# Patient Record
Sex: FEMALE | Race: BLACK OR AFRICAN AMERICAN | Employment: UNEMPLOYED | URBAN - METROPOLITAN AREA
[De-identification: names, ages, dates, MRNs, and addresses within clinical notes are randomized per-mention and may not be internally consistent; named-entity substitution may affect disease eponyms.]

---

## 2017-01-16 ENCOUNTER — HOSPITAL ENCOUNTER (OUTPATIENT)
Dept: ULTRASOUND IMAGING | Age: 22
Discharge: HOME | End: 2017-01-16
Payer: COMMERCIAL

## 2017-01-16 DIAGNOSIS — N91.2 AMENORRHEA: ICD-10-CM

## 2017-01-16 PROCEDURE — 76805 OB US >/= 14 WKS SNGL FETUS: CPT

## 2017-04-21 ENCOUNTER — HOSPITAL ENCOUNTER (OUTPATIENT)
Dept: INFUSION THERAPY | Age: 22
Setting detail: INFUSION SERIES
Discharge: HOME | End: 2017-04-21
Payer: COMMERCIAL

## 2017-04-21 VITALS
DIASTOLIC BLOOD PRESSURE: 62 MMHG | RESPIRATION RATE: 16 BRPM | TEMPERATURE: 97.8 F | HEART RATE: 88 BPM | SYSTOLIC BLOOD PRESSURE: 132 MMHG

## 2017-04-21 PROCEDURE — 96372 THER/PROPH/DIAG INJ SC/IM: CPT

## 2017-04-21 PROCEDURE — 6360000002 HC RX W HCPCS: Performed by: OBSTETRICS & GYNECOLOGY

## 2017-04-21 RX ADMIN — HUMAN RHO(D) IMMUNE GLOBULIN 300 MCG: 1500 SOLUTION INTRAMUSCULAR; INTRAVENOUS at 12:50

## 2017-05-31 ENCOUNTER — HOSPITAL ENCOUNTER (OUTPATIENT)
Age: 22
Discharge: HOME | End: 2017-05-31
Attending: OBSTETRICS & GYNECOLOGY | Admitting: OBSTETRICS & GYNECOLOGY
Payer: COMMERCIAL

## 2017-05-31 VITALS
DIASTOLIC BLOOD PRESSURE: 67 MMHG | RESPIRATION RATE: 20 BRPM | HEART RATE: 77 BPM | TEMPERATURE: 98.3 F | SYSTOLIC BLOOD PRESSURE: 139 MMHG

## 2017-05-31 LAB
AMPHETAMINE SCREEN, URINE: NORMAL
BACTERIA: ABNORMAL /HPF
BARBITURATE SCREEN URINE: NORMAL
BENZODIAZEPINE SCREEN, URINE: NORMAL
BILIRUBIN URINE: NEGATIVE
BLOOD, URINE: NEGATIVE
CANNABINOID SCREEN URINE: NORMAL
CLARITY: CLEAR
COCAINE METABOLITE SCREEN URINE: NORMAL
COLOR: YELLOW
EPITHELIAL CELLS, UA: ABNORMAL /HPF
GLUCOSE URINE: NEGATIVE MG/DL
KETONES, URINE: NEGATIVE MG/DL
LEUKOCYTE ESTERASE, URINE: ABNORMAL
Lab: NORMAL
NITRITE, URINE: NEGATIVE
OPIATE SCREEN URINE: NORMAL
PH UA: 7.5 (ref 5–9)
PHENCYCLIDINE SCREEN URINE: NORMAL
PROTEIN UA: NEGATIVE MG/DL
RBC UA: ABNORMAL /HPF (ref 0–2)
RENAL EPITHELIAL, UA: ABNORMAL /HPF
SPECIFIC GRAVITY UA: 1.01 (ref 1–1.03)
TRICHOMONAS: PRESENT /HPF
UROBILINOGEN, URINE: 2 E.U./DL
WBC UA: ABNORMAL /HPF (ref 0–5)
YEAST: PRESENT

## 2017-05-31 PROCEDURE — 99283 EMERGENCY DEPT VISIT LOW MDM: CPT | Performed by: OBSTETRICS & GYNECOLOGY

## 2017-05-31 PROCEDURE — 87491 CHLMYD TRACH DNA AMP PROBE: CPT

## 2017-05-31 PROCEDURE — 87086 URINE CULTURE/COLONY COUNT: CPT

## 2017-05-31 PROCEDURE — 59025 FETAL NON-STRESS TEST: CPT

## 2017-05-31 PROCEDURE — 80307 DRUG TEST PRSMV CHEM ANLYZR: CPT

## 2017-05-31 PROCEDURE — 99283 EMERGENCY DEPT VISIT LOW MDM: CPT

## 2017-05-31 PROCEDURE — 6370000000 HC RX 637 (ALT 250 FOR IP): Performed by: OBSTETRICS & GYNECOLOGY

## 2017-05-31 PROCEDURE — 87591 N.GONORRHOEAE DNA AMP PROB: CPT

## 2017-05-31 PROCEDURE — 81001 URINALYSIS AUTO W/SCOPE: CPT

## 2017-05-31 RX ORDER — METRONIDAZOLE 500 MG/1
500 TABLET ORAL 2 TIMES DAILY
Qty: 14 TABLET | Refills: 0 | Status: SHIPPED | OUTPATIENT
Start: 2017-05-31 | End: 2017-06-07

## 2017-05-31 RX ORDER — FERROUS SULFATE 325(65) MG
325 TABLET ORAL
COMMUNITY
End: 2019-08-08 | Stop reason: ALTCHOICE

## 2017-05-31 RX ORDER — ACETAMINOPHEN 325 MG/1
650 TABLET ORAL EVERY 4 HOURS PRN
Status: DISCONTINUED | OUTPATIENT
Start: 2017-05-31 | End: 2017-05-31 | Stop reason: HOSPADM

## 2017-05-31 RX ADMIN — ACETAMINOPHEN 650 MG: 325 TABLET ORAL at 16:49

## 2017-05-31 ASSESSMENT — PAIN SCALES - GENERAL: PAINLEVEL_OUTOF10: 0

## 2017-06-02 LAB
C. TRACHOMATIS DNA ,URINE: NEGATIVE
N. GONORRHOEAE DNA, URINE: NEGATIVE
URINE CULTURE, ROUTINE: NORMAL

## 2017-06-17 ENCOUNTER — HOSPITAL ENCOUNTER (INPATIENT)
Age: 22
LOS: 2 days | Discharge: HOME | DRG: 540 | End: 2017-06-19
Attending: OBSTETRICS & GYNECOLOGY | Admitting: OBSTETRICS & GYNECOLOGY
Payer: COMMERCIAL

## 2017-06-17 ENCOUNTER — ANESTHESIA EVENT (OUTPATIENT)
Dept: LABOR AND DELIVERY | Age: 22
DRG: 540 | End: 2017-06-17
Payer: COMMERCIAL

## 2017-06-17 ENCOUNTER — APPOINTMENT (OUTPATIENT)
Dept: GENERAL RADIOLOGY | Age: 22
DRG: 540 | End: 2017-06-17
Payer: COMMERCIAL

## 2017-06-17 ENCOUNTER — ANESTHESIA (OUTPATIENT)
Dept: LABOR AND DELIVERY | Age: 22
DRG: 540 | End: 2017-06-17
Payer: COMMERCIAL

## 2017-06-17 VITALS — SYSTOLIC BLOOD PRESSURE: 139 MMHG | OXYGEN SATURATION: 100 % | DIASTOLIC BLOOD PRESSURE: 79 MMHG

## 2017-06-17 LAB
ABO/RH: NORMAL
ALBUMIN SERPL-MCNC: 3.1 G/DL (ref 3.9–4.9)
ALP BLD-CCNC: 269 U/L (ref 40–130)
ALT SERPL-CCNC: 11 U/L (ref 0–33)
AMPHETAMINE SCREEN, URINE: NORMAL
ANION GAP SERPL CALCULATED.3IONS-SCNC: 15 MEQ/L (ref 7–13)
ANTIBODY IDENTIFICATION: NORMAL
ANTIBODY SCREEN: NORMAL
AST SERPL-CCNC: 15 U/L (ref 0–35)
BACTERIA: ABNORMAL /HPF
BARBITURATE SCREEN URINE: NORMAL
BASOPHILS ABSOLUTE: 0 K/UL (ref 0–0.2)
BASOPHILS RELATIVE PERCENT: 0.4 %
BENZODIAZEPINE SCREEN, URINE: NORMAL
BILIRUB SERPL-MCNC: 0.2 MG/DL (ref 0–1.2)
BILIRUBIN URINE: NEGATIVE
BLOOD, URINE: ABNORMAL
BUN BLDV-MCNC: 6 MG/DL (ref 6–20)
CALCIUM SERPL-MCNC: 8.5 MG/DL (ref 8.6–10.2)
CANNABINOID SCREEN URINE: NORMAL
CHLORIDE BLD-SCNC: 100 MEQ/L (ref 98–107)
CLARITY: ABNORMAL
CO2: 18 MEQ/L (ref 22–29)
COCAINE METABOLITE SCREEN URINE: NORMAL
COLOR: YELLOW
CREAT SERPL-MCNC: 0.76 MG/DL (ref 0.5–0.9)
EOSINOPHILS ABSOLUTE: 0.2 K/UL (ref 0–0.7)
EOSINOPHILS RELATIVE PERCENT: 1.9 %
EPITHELIAL CELLS, UA: ABNORMAL /HPF
FETAL SCREEN: NORMAL
GFR AFRICAN AMERICAN: >60
GFR NON-AFRICAN AMERICAN: >60
GLOBULIN: 2.6 G/DL (ref 2.3–3.5)
GLUCOSE BLD-MCNC: 88 MG/DL (ref 74–109)
GLUCOSE URINE: NEGATIVE MG/DL
HCT VFR BLD CALC: 35.2 % (ref 37–47)
HEMOGLOBIN: 11.7 G/DL (ref 12–16)
HEPATITIS B SURFACE ANTIGEN INTERPRETATION: NORMAL
KETONES, URINE: NEGATIVE MG/DL
LEUKOCYTE ESTERASE, URINE: ABNORMAL
LYMPHOCYTES ABSOLUTE: 2.7 K/UL (ref 1–4.8)
LYMPHOCYTES RELATIVE PERCENT: 31.4 %
Lab: NORMAL
MCH RBC QN AUTO: 32.6 PG (ref 27–31.3)
MCHC RBC AUTO-ENTMCNC: 33.4 % (ref 33–37)
MCV RBC AUTO: 97.8 FL (ref 82–100)
MONOCYTES ABSOLUTE: 0.6 K/UL (ref 0.2–0.8)
MONOCYTES RELATIVE PERCENT: 6.5 %
NEUTROPHILS ABSOLUTE: 5.2 K/UL (ref 1.4–6.5)
NEUTROPHILS RELATIVE PERCENT: 59.8 %
NITRITE, URINE: NEGATIVE
OPIATE SCREEN URINE: NORMAL
PDW BLD-RTO: 13.4 % (ref 11.5–14.5)
PH UA: 7 (ref 5–9)
PHENCYCLIDINE SCREEN URINE: NORMAL
PLATELET # BLD: 164 K/UL (ref 130–400)
POTASSIUM SERPL-SCNC: 3.8 MEQ/L (ref 3.5–5.1)
PROTEIN UA: 30 MG/DL
RBC # BLD: 3.6 M/UL (ref 4.2–5.4)
RBC UA: ABNORMAL /HPF (ref 0–2)
RUBELLA ANTIBODY IGG: 105.6 IU/ML
SODIUM BLD-SCNC: 133 MEQ/L (ref 132–144)
SPECIFIC GRAVITY UA: 1.01 (ref 1–1.03)
TOTAL PROTEIN: 5.7 G/DL (ref 6.4–8.1)
UROBILINOGEN, URINE: 1 E.U./DL
WBC # BLD: 8.6 K/UL (ref 4.5–11)
WBC UA: ABNORMAL /HPF (ref 0–5)

## 2017-06-17 PROCEDURE — 2500000003 HC RX 250 WO HCPCS: Performed by: NURSE ANESTHETIST, CERTIFIED REGISTERED

## 2017-06-17 PROCEDURE — 6370000000 HC RX 637 (ALT 250 FOR IP): Performed by: OBSTETRICS & GYNECOLOGY

## 2017-06-17 PROCEDURE — 36415 COLL VENOUS BLD VENIPUNCTURE: CPT

## 2017-06-17 PROCEDURE — 80307 DRUG TEST PRSMV CHEM ANLYZR: CPT

## 2017-06-17 PROCEDURE — 3700000000 HC ANESTHESIA ATTENDED CARE

## 2017-06-17 PROCEDURE — 6360000002 HC RX W HCPCS: Performed by: NURSE ANESTHETIST, CERTIFIED REGISTERED

## 2017-06-17 PROCEDURE — 88307 TISSUE EXAM BY PATHOLOGIST: CPT

## 2017-06-17 PROCEDURE — 87340 HEPATITIS B SURFACE AG IA: CPT

## 2017-06-17 PROCEDURE — 86317 IMMUNOASSAY INFECTIOUS AGENT: CPT

## 2017-06-17 PROCEDURE — 4A1HXCZ MONITORING OF PRODUCTS OF CONCEPTION, CARDIAC RATE, EXTERNAL APPROACH: ICD-10-PCS

## 2017-06-17 PROCEDURE — 80053 COMPREHEN METABOLIC PANEL: CPT

## 2017-06-17 PROCEDURE — 6360000002 HC RX W HCPCS: Performed by: OBSTETRICS & GYNECOLOGY

## 2017-06-17 PROCEDURE — 85025 COMPLETE CBC W/AUTO DIFF WBC: CPT

## 2017-06-17 PROCEDURE — 74000 XR ABDOMEN LIMITED (KUB): CPT

## 2017-06-17 PROCEDURE — 3609079900 HC CESAREAN SECTION

## 2017-06-17 PROCEDURE — 3700000001 HC ADD 15 MINUTES (ANESTHESIA)

## 2017-06-17 PROCEDURE — 86850 RBC ANTIBODY SCREEN: CPT

## 2017-06-17 PROCEDURE — 86900 BLOOD TYPING SEROLOGIC ABO: CPT

## 2017-06-17 PROCEDURE — 6360000002 HC RX W HCPCS

## 2017-06-17 PROCEDURE — 86870 RBC ANTIBODY IDENTIFICATION: CPT

## 2017-06-17 PROCEDURE — 86592 SYPHILIS TEST NON-TREP QUAL: CPT

## 2017-06-17 PROCEDURE — 85461 HEMOGLOBIN FETAL: CPT

## 2017-06-17 PROCEDURE — 86901 BLOOD TYPING SEROLOGIC RH(D): CPT

## 2017-06-17 PROCEDURE — 2580000003 HC RX 258: Performed by: OBSTETRICS & GYNECOLOGY

## 2017-06-17 PROCEDURE — 1220000000 HC SEMI PRIVATE OB R&B

## 2017-06-17 PROCEDURE — 6370000000 HC RX 637 (ALT 250 FOR IP)

## 2017-06-17 PROCEDURE — 3700000025 ANESTHESIA EPIDURAL BLOCK: Performed by: NURSE ANESTHETIST, CERTIFIED REGISTERED

## 2017-06-17 PROCEDURE — 2580000003 HC RX 258

## 2017-06-17 PROCEDURE — 81001 URINALYSIS AUTO W/SCOPE: CPT

## 2017-06-17 RX ORDER — SODIUM CHLORIDE 0.9 % (FLUSH) 0.9 %
10 SYRINGE (ML) INJECTION PRN
Status: DISCONTINUED | OUTPATIENT
Start: 2017-06-17 | End: 2017-06-19 | Stop reason: HOSPADM

## 2017-06-17 RX ORDER — LANOLIN 100 %
OINTMENT (GRAM) TOPICAL
Status: DISCONTINUED | OUTPATIENT
Start: 2017-06-17 | End: 2017-06-19 | Stop reason: HOSPADM

## 2017-06-17 RX ORDER — IBUPROFEN 800 MG/1
800 TABLET ORAL EVERY 8 HOURS SCHEDULED
Status: DISCONTINUED | OUTPATIENT
Start: 2017-06-17 | End: 2017-06-19 | Stop reason: HOSPADM

## 2017-06-17 RX ORDER — NALBUPHINE HCL 10 MG/ML
10 AMPUL (ML) INJECTION
Status: DISCONTINUED | OUTPATIENT
Start: 2017-06-17 | End: 2017-06-19 | Stop reason: HOSPADM

## 2017-06-17 RX ORDER — HYDROMORPHONE HCL 110MG/55ML
0.25 PATIENT CONTROLLED ANALGESIA SYRINGE INTRAVENOUS
Status: DISCONTINUED | OUTPATIENT
Start: 2017-06-17 | End: 2017-06-19 | Stop reason: HOSPADM

## 2017-06-17 RX ORDER — KETOROLAC TROMETHAMINE 30 MG/ML
30 INJECTION, SOLUTION INTRAMUSCULAR; INTRAVENOUS EVERY 6 HOURS
Status: ACTIVE | OUTPATIENT
Start: 2017-06-17 | End: 2017-06-18

## 2017-06-17 RX ORDER — KETOROLAC TROMETHAMINE 30 MG/ML
INJECTION, SOLUTION INTRAMUSCULAR; INTRAVENOUS PRN
Status: DISCONTINUED | OUTPATIENT
Start: 2017-06-17 | End: 2017-06-17 | Stop reason: SDUPTHER

## 2017-06-17 RX ORDER — OXYCODONE HYDROCHLORIDE AND ACETAMINOPHEN 5; 325 MG/1; MG/1
2 TABLET ORAL EVERY 4 HOURS PRN
Status: DISCONTINUED | OUTPATIENT
Start: 2017-06-17 | End: 2017-06-19 | Stop reason: HOSPADM

## 2017-06-17 RX ORDER — HYDROMORPHONE HCL 110MG/55ML
0.5 PATIENT CONTROLLED ANALGESIA SYRINGE INTRAVENOUS
Status: DISCONTINUED | OUTPATIENT
Start: 2017-06-17 | End: 2017-06-19 | Stop reason: HOSPADM

## 2017-06-17 RX ORDER — SODIUM CHLORIDE 0.9 % (FLUSH) 0.9 %
10 SYRINGE (ML) INJECTION EVERY 12 HOURS SCHEDULED
Status: DISCONTINUED | OUTPATIENT
Start: 2017-06-17 | End: 2017-06-19 | Stop reason: HOSPADM

## 2017-06-17 RX ORDER — NALOXONE HYDROCHLORIDE 0.4 MG/ML
0.4 INJECTION, SOLUTION INTRAMUSCULAR; INTRAVENOUS; SUBCUTANEOUS PRN
Status: DISCONTINUED | OUTPATIENT
Start: 2017-06-17 | End: 2017-06-19 | Stop reason: HOSPADM

## 2017-06-17 RX ORDER — ONDANSETRON 2 MG/ML
4 INJECTION INTRAMUSCULAR; INTRAVENOUS EVERY 6 HOURS PRN
Status: DISCONTINUED | OUTPATIENT
Start: 2017-06-17 | End: 2017-06-19 | Stop reason: HOSPADM

## 2017-06-17 RX ORDER — SODIUM CHLORIDE, SODIUM LACTATE, POTASSIUM CHLORIDE, CALCIUM CHLORIDE 600; 310; 30; 20 MG/100ML; MG/100ML; MG/100ML; MG/100ML
INJECTION, SOLUTION INTRAVENOUS
Status: DISPENSED
Start: 2017-06-17 | End: 2017-06-17

## 2017-06-17 RX ORDER — NALBUPHINE HCL 10 MG/ML
5 AMPUL (ML) INJECTION EVERY 4 HOURS PRN
Status: DISCONTINUED | OUTPATIENT
Start: 2017-06-17 | End: 2017-06-19 | Stop reason: HOSPADM

## 2017-06-17 RX ORDER — DOCUSATE SODIUM 100 MG/1
100 CAPSULE, LIQUID FILLED ORAL 2 TIMES DAILY
Status: DISCONTINUED | OUTPATIENT
Start: 2017-06-17 | End: 2017-06-19 | Stop reason: HOSPADM

## 2017-06-17 RX ORDER — NALOXONE HYDROCHLORIDE 0.4 MG/ML
0.4 INJECTION, SOLUTION INTRAMUSCULAR; INTRAVENOUS; SUBCUTANEOUS PRN
Status: DISCONTINUED | OUTPATIENT
Start: 2017-06-17 | End: 2017-06-17

## 2017-06-17 RX ORDER — OXYTOCIN 10 [USP'U]/ML
INJECTION, SOLUTION INTRAMUSCULAR; INTRAVENOUS PRN
Status: DISCONTINUED | OUTPATIENT
Start: 2017-06-17 | End: 2017-06-17 | Stop reason: SDUPTHER

## 2017-06-17 RX ORDER — FENTANYL CITRATE 50 UG/ML
INJECTION, SOLUTION INTRAMUSCULAR; INTRAVENOUS PRN
Status: DISCONTINUED | OUTPATIENT
Start: 2017-06-17 | End: 2017-06-17 | Stop reason: SDUPTHER

## 2017-06-17 RX ORDER — ONDANSETRON 2 MG/ML
INJECTION INTRAMUSCULAR; INTRAVENOUS PRN
Status: DISCONTINUED | OUTPATIENT
Start: 2017-06-17 | End: 2017-06-17 | Stop reason: SDUPTHER

## 2017-06-17 RX ORDER — LIDOCAINE HYDROCHLORIDE 20 MG/ML
INJECTION, SOLUTION EPIDURAL; INFILTRATION; INTRACAUDAL; PERINEURAL PRN
Status: DISCONTINUED | OUTPATIENT
Start: 2017-06-17 | End: 2017-06-17 | Stop reason: SDUPTHER

## 2017-06-17 RX ORDER — OXYCODONE HYDROCHLORIDE AND ACETAMINOPHEN 5; 325 MG/1; MG/1
1 TABLET ORAL EVERY 4 HOURS PRN
Status: DISCONTINUED | OUTPATIENT
Start: 2017-06-17 | End: 2017-06-19 | Stop reason: HOSPADM

## 2017-06-17 RX ORDER — SODIUM CHLORIDE, SODIUM LACTATE, POTASSIUM CHLORIDE, CALCIUM CHLORIDE 600; 310; 30; 20 MG/100ML; MG/100ML; MG/100ML; MG/100ML
INJECTION, SOLUTION INTRAVENOUS CONTINUOUS
Status: DISCONTINUED | OUTPATIENT
Start: 2017-06-17 | End: 2017-06-19 | Stop reason: HOSPADM

## 2017-06-17 RX ORDER — DEXAMETHASONE SODIUM PHOSPHATE 10 MG/ML
INJECTION INTRAMUSCULAR; INTRAVENOUS PRN
Status: DISCONTINUED | OUTPATIENT
Start: 2017-06-17 | End: 2017-06-17 | Stop reason: SDUPTHER

## 2017-06-17 RX ORDER — ACETAMINOPHEN 325 MG/1
650 TABLET ORAL EVERY 4 HOURS PRN
Status: DISCONTINUED | OUTPATIENT
Start: 2017-06-17 | End: 2017-06-19 | Stop reason: HOSPADM

## 2017-06-17 RX ORDER — CEFAZOLIN SODIUM 1 G/3ML
INJECTION, POWDER, FOR SOLUTION INTRAMUSCULAR; INTRAVENOUS PRN
Status: DISCONTINUED | OUTPATIENT
Start: 2017-06-17 | End: 2017-06-17 | Stop reason: SDUPTHER

## 2017-06-17 RX ADMIN — CEFAZOLIN 2000 MG: 330 INJECTION, POWDER, FOR SOLUTION INTRAMUSCULAR; INTRAVENOUS at 06:30

## 2017-06-17 RX ADMIN — OXYCODONE HYDROCHLORIDE AND ACETAMINOPHEN 2 TABLET: 5; 325 TABLET ORAL at 20:27

## 2017-06-17 RX ADMIN — SODIUM CHLORIDE, POTASSIUM CHLORIDE, SODIUM LACTATE AND CALCIUM CHLORIDE: 600; 310; 30; 20 INJECTION, SOLUTION INTRAVENOUS at 05:50

## 2017-06-17 RX ADMIN — LIDOCAINE HYDROCHLORIDE 10 ML: 20 INJECTION, SOLUTION EPIDURAL; INFILTRATION; INTRACAUDAL; PERINEURAL at 06:27

## 2017-06-17 RX ADMIN — HUMAN RHO(D) IMMUNE GLOBULIN 300 MCG: 1500 SOLUTION INTRAMUSCULAR; INTRAVENOUS at 16:30

## 2017-06-17 RX ADMIN — FENTANYL CITRATE 100 MCG: 50 INJECTION, SOLUTION INTRAMUSCULAR; INTRAVENOUS at 06:27

## 2017-06-17 RX ADMIN — NALBUPHINE HYDROCHLORIDE 10 MG: 10 INJECTION, SOLUTION INTRAMUSCULAR; INTRAVENOUS; SUBCUTANEOUS at 02:13

## 2017-06-17 RX ADMIN — IBUPROFEN 800 MG: 800 TABLET, FILM COATED ORAL at 20:28

## 2017-06-17 RX ADMIN — SODIUM CHLORIDE, POTASSIUM CHLORIDE, SODIUM LACTATE AND CALCIUM CHLORIDE: 600; 310; 30; 20 INJECTION, SOLUTION INTRAVENOUS at 18:22

## 2017-06-17 RX ADMIN — DEXAMETHASONE SODIUM PHOSPHATE 10 MG: 10 INJECTION INTRAMUSCULAR; INTRAVENOUS at 06:34

## 2017-06-17 RX ADMIN — ONDANSETRON 4 MG: 2 INJECTION, SOLUTION INTRAMUSCULAR; INTRAVENOUS at 06:34

## 2017-06-17 RX ADMIN — OXYTOCIN 10 UNITS: 10 INJECTION INTRAVENOUS at 06:41

## 2017-06-17 RX ADMIN — SODIUM CHLORIDE, POTASSIUM CHLORIDE, SODIUM LACTATE AND CALCIUM CHLORIDE: 600; 310; 30; 20 INJECTION, SOLUTION INTRAVENOUS at 06:50

## 2017-06-17 RX ADMIN — SODIUM CHLORIDE, POTASSIUM CHLORIDE, SODIUM LACTATE AND CALCIUM CHLORIDE 125 ML/HR: 600; 310; 30; 20 INJECTION, SOLUTION INTRAVENOUS at 10:18

## 2017-06-17 RX ADMIN — DEXTROSE MONOHYDRATE 5 MILLION UNITS: 5 INJECTION INTRAVENOUS at 02:16

## 2017-06-17 RX ADMIN — KETOROLAC TROMETHAMINE 30 MG: 30 INJECTION, SOLUTION INTRAMUSCULAR; INTRAVENOUS at 06:49

## 2017-06-17 RX ADMIN — OXYTOCIN 20 UNITS: 10 INJECTION INTRAVENOUS at 06:50

## 2017-06-17 RX ADMIN — Medication: at 09:59

## 2017-06-17 RX ADMIN — DOCUSATE SODIUM 100 MG: 100 CAPSULE ORAL at 20:30

## 2017-06-17 ASSESSMENT — PAIN SCALES - GENERAL
PAINLEVEL_OUTOF10: 8
PAINLEVEL_OUTOF10: 7
PAINLEVEL_OUTOF10: 0

## 2017-06-18 LAB
HCT VFR BLD CALC: 24.9 % (ref 37–47)
HEMOGLOBIN: 8.3 G/DL (ref 12–16)
MCH RBC QN AUTO: 32.4 PG (ref 27–31.3)
MCHC RBC AUTO-ENTMCNC: 33.3 % (ref 33–37)
MCV RBC AUTO: 97.2 FL (ref 82–100)
PDW BLD-RTO: 13.4 % (ref 11.5–14.5)
PLATELET # BLD: 133 K/UL (ref 130–400)
RBC # BLD: 2.57 M/UL (ref 4.2–5.4)
RPR: NORMAL
WBC # BLD: 13.5 K/UL (ref 4.5–11)

## 2017-06-18 PROCEDURE — 6370000000 HC RX 637 (ALT 250 FOR IP)

## 2017-06-18 PROCEDURE — 85027 COMPLETE CBC AUTOMATED: CPT

## 2017-06-18 PROCEDURE — 6370000000 HC RX 637 (ALT 250 FOR IP): Performed by: OBSTETRICS & GYNECOLOGY

## 2017-06-18 PROCEDURE — 99024 POSTOP FOLLOW-UP VISIT: CPT | Performed by: OBSTETRICS & GYNECOLOGY

## 2017-06-18 PROCEDURE — 36415 COLL VENOUS BLD VENIPUNCTURE: CPT

## 2017-06-18 PROCEDURE — 59514 CESAREAN DELIVERY ONLY: CPT

## 2017-06-18 PROCEDURE — 1220000000 HC SEMI PRIVATE OB R&B

## 2017-06-18 RX ADMIN — IBUPROFEN 800 MG: 800 TABLET, FILM COATED ORAL at 22:02

## 2017-06-18 RX ADMIN — IBUPROFEN 800 MG: 800 TABLET, FILM COATED ORAL at 13:54

## 2017-06-18 RX ADMIN — OXYCODONE HYDROCHLORIDE AND ACETAMINOPHEN 2 TABLET: 5; 325 TABLET ORAL at 10:25

## 2017-06-18 RX ADMIN — OXYCODONE HYDROCHLORIDE AND ACETAMINOPHEN 1 TABLET: 5; 325 TABLET ORAL at 21:22

## 2017-06-18 RX ADMIN — DOCUSATE SODIUM 100 MG: 100 CAPSULE ORAL at 10:24

## 2017-06-18 RX ADMIN — IBUPROFEN 800 MG: 800 TABLET, FILM COATED ORAL at 06:19

## 2017-06-18 ASSESSMENT — PAIN SCALES - GENERAL
PAINLEVEL_OUTOF10: 7
PAINLEVEL_OUTOF10: 6
PAINLEVEL_OUTOF10: 4
PAINLEVEL_OUTOF10: 7
PAINLEVEL_OUTOF10: 4

## 2017-06-19 VITALS
TEMPERATURE: 98.2 F | RESPIRATION RATE: 20 BRPM | HEIGHT: 65 IN | DIASTOLIC BLOOD PRESSURE: 80 MMHG | OXYGEN SATURATION: 100 % | BODY MASS INDEX: 31.65 KG/M2 | HEART RATE: 67 BPM | WEIGHT: 190 LBS | SYSTOLIC BLOOD PRESSURE: 133 MMHG

## 2017-06-19 PROCEDURE — 6370000000 HC RX 637 (ALT 250 FOR IP): Performed by: OBSTETRICS & GYNECOLOGY

## 2017-06-19 PROCEDURE — 6370000000 HC RX 637 (ALT 250 FOR IP)

## 2017-06-19 PROCEDURE — 2500000003 HC RX 250 WO HCPCS

## 2017-06-19 RX ORDER — OXYCODONE HYDROCHLORIDE AND ACETAMINOPHEN 5; 325 MG/1; MG/1
1 TABLET ORAL EVERY 6 HOURS PRN
Qty: 30 TABLET | Refills: 0 | Status: SHIPPED | OUTPATIENT
Start: 2017-06-19 | End: 2017-06-26

## 2017-06-19 RX ORDER — IBUPROFEN 600 MG/1
600 TABLET ORAL EVERY 6 HOURS PRN
Qty: 60 TABLET | Refills: 3 | Status: SHIPPED | OUTPATIENT
Start: 2017-06-19 | End: 2019-08-08 | Stop reason: ALTCHOICE

## 2017-06-19 RX ADMIN — OXYCODONE HYDROCHLORIDE AND ACETAMINOPHEN 1 TABLET: 5; 325 TABLET ORAL at 05:01

## 2017-06-19 RX ADMIN — IBUPROFEN 800 MG: 800 TABLET, FILM COATED ORAL at 05:53

## 2017-06-19 RX ADMIN — OXYCODONE HYDROCHLORIDE AND ACETAMINOPHEN 2 TABLET: 5; 325 TABLET ORAL at 01:04

## 2017-06-19 RX ADMIN — DOCUSATE SODIUM 100 MG: 100 CAPSULE ORAL at 09:16

## 2017-06-19 RX ADMIN — OXYCODONE HYDROCHLORIDE AND ACETAMINOPHEN 2 TABLET: 5; 325 TABLET ORAL at 12:46

## 2017-06-19 ASSESSMENT — PAIN SCALES - GENERAL
PAINLEVEL_OUTOF10: 6
PAINLEVEL_OUTOF10: 3
PAINLEVEL_OUTOF10: 5
PAINLEVEL_OUTOF10: 5

## 2017-09-20 LAB — PATHOLOGY REPORT: NORMAL

## 2018-05-30 ENCOUNTER — OFFICE VISIT (OUTPATIENT)
Dept: FAMILY MEDICINE CLINIC | Age: 23
End: 2018-05-30
Payer: COMMERCIAL

## 2018-05-30 VITALS
WEIGHT: 185 LBS | HEIGHT: 65 IN | DIASTOLIC BLOOD PRESSURE: 78 MMHG | SYSTOLIC BLOOD PRESSURE: 136 MMHG | OXYGEN SATURATION: 100 % | TEMPERATURE: 98.8 F | HEART RATE: 78 BPM | BODY MASS INDEX: 30.82 KG/M2

## 2018-05-30 DIAGNOSIS — T73.3XXA FATIGUE DUE TO EXCESSIVE EXERTION, INITIAL ENCOUNTER: Primary | ICD-10-CM

## 2018-05-30 PROCEDURE — 99213 OFFICE O/P EST LOW 20 MIN: CPT | Performed by: NURSE PRACTITIONER

## 2018-05-30 PROCEDURE — 4004F PT TOBACCO SCREEN RCVD TLK: CPT | Performed by: NURSE PRACTITIONER

## 2018-05-30 PROCEDURE — G8417 CALC BMI ABV UP PARAM F/U: HCPCS | Performed by: NURSE PRACTITIONER

## 2018-05-30 PROCEDURE — G8427 DOCREV CUR MEDS BY ELIG CLIN: HCPCS | Performed by: NURSE PRACTITIONER

## 2018-05-30 ASSESSMENT — ENCOUNTER SYMPTOMS
SHORTNESS OF BREATH: 0
NAUSEA: 0
DIARRHEA: 0
CONSTIPATION: 0
COUGH: 0
ABDOMINAL PAIN: 0
SORE THROAT: 0
BACK PAIN: 0

## 2019-04-08 ENCOUNTER — HOSPITAL ENCOUNTER (EMERGENCY)
Age: 24
Discharge: HOME | End: 2019-04-08
Attending: EMERGENCY MEDICINE
Payer: COMMERCIAL

## 2019-04-08 VITALS
HEART RATE: 93 BPM | OXYGEN SATURATION: 98 % | DIASTOLIC BLOOD PRESSURE: 66 MMHG | WEIGHT: 179 LBS | SYSTOLIC BLOOD PRESSURE: 119 MMHG | BODY MASS INDEX: 29.82 KG/M2 | RESPIRATION RATE: 16 BRPM | TEMPERATURE: 98.6 F | HEIGHT: 65 IN

## 2019-04-08 DIAGNOSIS — A64 SEXUALLY TRANSMITTED DISEASE (STD): Primary | ICD-10-CM

## 2019-04-08 PROCEDURE — 99282 EMERGENCY DEPT VISIT SF MDM: CPT

## 2019-04-08 PROCEDURE — 6360000002 HC RX W HCPCS: Performed by: EMERGENCY MEDICINE

## 2019-04-08 PROCEDURE — 96372 THER/PROPH/DIAG INJ SC/IM: CPT

## 2019-04-08 PROCEDURE — 6370000000 HC RX 637 (ALT 250 FOR IP): Performed by: EMERGENCY MEDICINE

## 2019-04-08 RX ORDER — METRONIDAZOLE 500 MG/1
2000 TABLET ORAL ONCE
Status: COMPLETED | OUTPATIENT
Start: 2019-04-08 | End: 2019-04-08

## 2019-04-08 RX ORDER — AZITHROMYCIN 1 G
1 PACKET (EA) ORAL ONCE
Status: COMPLETED | OUTPATIENT
Start: 2019-04-08 | End: 2019-04-08

## 2019-04-08 RX ORDER — CEFTRIAXONE SODIUM 250 MG/1
250 INJECTION, POWDER, FOR SOLUTION INTRAMUSCULAR; INTRAVENOUS ONCE
Status: COMPLETED | OUTPATIENT
Start: 2019-04-08 | End: 2019-04-08

## 2019-04-08 RX ADMIN — METRONIDAZOLE 2000 MG: 500 TABLET ORAL at 19:53

## 2019-04-08 RX ADMIN — CEFTRIAXONE SODIUM 250 MG: 250 INJECTION, POWDER, FOR SOLUTION INTRAMUSCULAR; INTRAVENOUS at 20:08

## 2019-04-08 RX ADMIN — AZITHROMYCIN 1 G: 1 POWDER, FOR SUSPENSION ORAL at 19:54

## 2019-04-08 ASSESSMENT — ENCOUNTER SYMPTOMS
WHEEZING: 0
VOMITING: 0
FACIAL SWELLING: 0
RHINORRHEA: 0
SHORTNESS OF BREATH: 0
EYE DISCHARGE: 0
COLOR CHANGE: 0
ABDOMINAL PAIN: 0
PHOTOPHOBIA: 0
ABDOMINAL DISTENTION: 0

## 2019-04-09 NOTE — ED PROVIDER NOTES
[facility] ED  eMERGENCY dEPARTMENT eNCOUnter      Pt Name: Claudetta Honour  MRN: [id number]  [place] 1995  Date of evaluation: 4/8/2019  Provider: Rajni Zavala, [address] [place] [address]       Chief Complaint   Patient presents with    Vaginal Discharge     x 3 days (yellow)         HISTORY OF PRESENT ILLNESS   (Location/Symptom, Timing/Onset,Context/Setting, Quality, Duration, Modifying Factors, Severity)  Note limiting factors. Claudetta Honour is a 26 y.o. female who presents to the emergency department with a chief complaint of yellow vaginal discharge for three days. She states that she and her boyfriend had been on a break for a while and he came to her after they had gotten back together and told her that he was having discharge from his penis. She developed symptoms shortly afterward. He was treated and she is asking to be treated. She denies any abdominal pain or vaginal bleeding. She denies pregnancy stating that she just took a home pregnancy test that was negative. HPI    NursingNotes were reviewed. REVIEW OF SYSTEMS    (2-9 systems for level 4, 10 or more for level 5)     Review of Systems   Constitutional: Negative for activity change and appetite change. HENT: Negative for congestion, facial swelling and rhinorrhea. Eyes: Negative for photophobia and discharge. Respiratory: Negative for shortness of breath and wheezing. Cardiovascular: Negative for chest pain. Gastrointestinal: Negative for abdominal distention, abdominal pain and vomiting. Endocrine: Negative for polydipsia and polyphagia. Genitourinary: Positive for vaginal discharge. Negative for difficulty urinating, frequency, menstrual problem, pelvic pain and vaginal bleeding. Musculoskeletal: Negative for gait problem. Skin: Negative for color change. Allergic/Immunologic: Negative for immunocompromised state. Neurological: Negative for dizziness, weakness and light-headedness. Hematological: Negative for adenopathy. Psychiatric/Behavioral: Negative for behavioral problems. Except as noted above the remainder of the review of systems was reviewed and negative. PAST MEDICAL HISTORY   History reviewed. No pertinent past medical history. SURGICALHISTORY       Past Surgical History:   Procedure Laterality Date     SECTION N/A 2017     SECTION performed by Yelitza Collins MD at [facility] OR    TONSILLECTOMY AND ADENOIDECTOMY           CURRENT MEDICATIONS       Previous Medications    FERROUS SULFATE 325 (65 FE) MG TABLET    Take 325 mg by mouth daily (with breakfast)    IBUPROFEN (ADVIL;MOTRIN) 600 MG TABLET    Take 1 tablet by mouth every 6 hours as needed for Pain       ALLERGIES     Patient has no known allergies. FAMILY HISTORY     History reviewed. No pertinent family history.        SOCIAL HISTORY       Social History     Socioeconomic History    Marital status: Single     Spouse name: None    Number of children: None    Years of education: None    Highest education level: None   Occupational History    None   Social Needs    Financial resource strain: None    Food insecurity:     Worry: None     Inability: None    Transportation needs:     Medical: None     Non-medical: None   Tobacco Use    Smoking status: Current Every Day Smoker     Packs/day: 1.00     Types: Cigarettes    Smokeless tobacco: Never Used   Substance and Sexual Activity    Alcohol use: No    Drug use: No    Sexual activity: None   Lifestyle    Physical activity:     Days per week: None     Minutes per session: None    Stress: None   Relationships    Social connections:     Talks on phone: None     Gets together: None     Attends Zoroastrian service: None     Active member of club or organization: None     Attends meetings of clubs or organizations: None     Relationship status: None    Intimate partner violence:     Fear of current or ex partner: None other labs were within normal range or not returned as of this dictation. EMERGENCY DEPARTMENT COURSE and DIFFERENTIAL DIAGNOSIS/MDM:   Vitals:    Vitals:    04/08/19 1855   BP: 119/66   Pulse: 93   Resp: 16   Temp: 98.6 °F (37 °C)   TempSrc: Oral   SpO2: 98%   Weight: 179 lb (81.2 kg)   Height: 5' 5\" (1.651 m)       Patient declines intravaginal exam asking if she can just be treated. We discuss the need to feel for cervical motion tenderness and she states that she has had no belly pain and she just doesn't feel like going through it. We talk about how this can affect fertility and she finally agrees. I do a vaginal exam and her cervix is normal in color and nontender on manipulation. She is treated with medicines for all sexually transmitted diseases and cultures are not sent per her request.  She is discharged in stable condition. She knows to not engage in sex again until both her and her partner's symptoms clear completely. Should her symptoms persist despite treatment today she is to follow up with [facility] and dentistry. [facility]    CRITICAL CARE TIME   Total Critical Care time was 0 minutes, excluding separately reportableprocedures. There was a high probability of clinicallysignificant/life threatening deterioration in the patient's condition which required my urgent intervention. CONSULTS:  None    PROCEDURES:  Unless otherwise noted below, none     Procedures    FINAL IMPRESSION      1. Sexually transmitted disease (STD)          DISPOSITION/PLAN   DISPOSITION Discharge - Pending Orders Complete 04/08/2019 08:13:43 PM      PATIENT REFERRED TO:  [address] [place] [address].   [address] [place], [place] [id number] [phone]      As needed      DISCHARGE MEDICATIONS:  New Prescriptions    No medications on file          (Please note that portions of this note were completed with a voice recognition program.  Efforts were made to edit the dictations but occasionally words are mis-transcribed.)    Keira Pop DO (electronically signed)  Attending Emergency Physician          Keira Pop DO  04/08/19 2026

## 2019-08-08 ENCOUNTER — OFFICE VISIT (OUTPATIENT)
Dept: FAMILY MEDICINE CLINIC | Age: 24
End: 2019-08-08
Payer: COMMERCIAL

## 2019-08-08 VITALS
HEIGHT: 65 IN | WEIGHT: 179.8 LBS | HEART RATE: 91 BPM | SYSTOLIC BLOOD PRESSURE: 136 MMHG | RESPIRATION RATE: 16 BRPM | OXYGEN SATURATION: 100 % | DIASTOLIC BLOOD PRESSURE: 76 MMHG | BODY MASS INDEX: 29.96 KG/M2 | TEMPERATURE: 98.3 F

## 2019-08-08 DIAGNOSIS — N89.8 VAGINAL DISCHARGE: ICD-10-CM

## 2019-08-08 DIAGNOSIS — N89.8 VAGINAL DISCHARGE: Primary | ICD-10-CM

## 2019-08-08 LAB
BILIRUBIN, POC: ABNORMAL
BLOOD URINE, POC: ABNORMAL
CLARITY, POC: ABNORMAL
COLOR, POC: ABNORMAL
CONTROL: NORMAL
GLUCOSE URINE, POC: ABNORMAL
KETONES, POC: ABNORMAL
LEUKOCYTE EST, POC: ABNORMAL
NITRITE, POC: ABNORMAL
PH, POC: 7
PREGNANCY TEST URINE, POC: NORMAL
PROTEIN, POC: ABNORMAL
SPECIFIC GRAVITY, POC: 1.02
UROBILINOGEN, POC: ABNORMAL

## 2019-08-08 PROCEDURE — 81025 URINE PREGNANCY TEST: CPT | Performed by: NURSE PRACTITIONER

## 2019-08-08 PROCEDURE — 96372 THER/PROPH/DIAG INJ SC/IM: CPT | Performed by: NURSE PRACTITIONER

## 2019-08-08 PROCEDURE — 99214 OFFICE O/P EST MOD 30 MIN: CPT | Performed by: NURSE PRACTITIONER

## 2019-08-08 PROCEDURE — 81003 URINALYSIS AUTO W/O SCOPE: CPT | Performed by: NURSE PRACTITIONER

## 2019-08-08 RX ORDER — METRONIDAZOLE 500 MG/1
500 TABLET ORAL 2 TIMES DAILY
Qty: 14 TABLET | Refills: 0 | Status: SHIPPED | OUTPATIENT
Start: 2019-08-08 | End: 2019-08-15

## 2019-08-08 RX ORDER — CEFTRIAXONE 1 G/1
1 INJECTION, POWDER, FOR SOLUTION INTRAMUSCULAR; INTRAVENOUS ONCE
Status: COMPLETED | OUTPATIENT
Start: 2019-08-08 | End: 2019-08-08

## 2019-08-08 RX ORDER — CEFTRIAXONE 1 G/1
1 INJECTION, POWDER, FOR SOLUTION INTRAMUSCULAR; INTRAVENOUS ONCE
Qty: 1 G | Refills: 0
Start: 2019-08-08 | End: 2019-08-08 | Stop reason: CLARIF

## 2019-08-08 RX ADMIN — CEFTRIAXONE 1 G: 1 INJECTION, POWDER, FOR SOLUTION INTRAMUSCULAR; INTRAVENOUS at 16:58

## 2019-08-08 ASSESSMENT — ENCOUNTER SYMPTOMS
GASTROINTESTINAL NEGATIVE: 1
SHORTNESS OF BREATH: 0
EYE PAIN: 0
VOMITING: 0
CONSTIPATION: 0
DIARRHEA: 0
RESPIRATORY NEGATIVE: 1
COUGH: 0
BACK PAIN: 0
WHEEZING: 0
ABDOMINAL PAIN: 0
NAUSEA: 0
SORE THROAT: 0
EYE DISCHARGE: 0

## 2019-08-08 ASSESSMENT — PATIENT HEALTH QUESTIONNAIRE - PHQ9
SUM OF ALL RESPONSES TO PHQ9 QUESTIONS 1 & 2: 0
1. LITTLE INTEREST OR PLEASURE IN DOING THINGS: 0
SUM OF ALL RESPONSES TO PHQ QUESTIONS 1-9: 0
SUM OF ALL RESPONSES TO PHQ QUESTIONS 1-9: 0
2. FEELING DOWN, DEPRESSED OR HOPELESS: 0

## 2019-08-08 NOTE — PROGRESS NOTES
cefTRIAXone (ROCEPHIN) 1 g in dextrose 5 % 50 mL IVPB    cefTRIAXone (ROCEPHIN) injection 1 g     Medications Discontinued During This Encounter   Medication Reason    ferrous sulfate 325 (65 FE) MG tablet Therapy completed    ibuprofen (ADVIL;MOTRIN) 600 MG tablet Therapy completed    cefTRIAXone (ROCEPHIN) 1 g injection ERROR    cefTRIAXone (ROCEPHIN) 1 g in dextrose 5 % 50 mL IVPB ERROR     Return if symptoms worsen or fail to improve. Reviewed with the patient: current clinical status,medications, activities and diet. Side effects, adverse effects of the medication prescribed today, as well as treatment plan/ rationale and result expectations have been discussed with the patient who expresses understanding and desires to proceed. Close follow up to evaluate treatment results and for coordination of care. I have reviewed the patient's medical history in detail and updated the computerized patient record.     Murphy Segundo, APRN - CNP

## 2019-08-09 LAB
CLUE CELLS: NORMAL
TRICHOMONAS PREP: NORMAL
TRICHOMONAS VAGINALIS SCREEN: NEGATIVE
YEAST WET PREP: NORMAL

## 2019-08-11 LAB
SPECIMEN SOURCE: NORMAL
T. VAGINALIS AMPLIFIED: NEGATIVE

## 2019-08-13 LAB
C TRACH DNA GENITAL QL NAA+PROBE: NEGATIVE
N. GONORRHOEAE DNA: NEGATIVE

## 2020-05-07 ENCOUNTER — OFFICE VISIT (OUTPATIENT)
Dept: OBGYN CLINIC | Age: 25
End: 2020-05-07
Payer: COMMERCIAL

## 2020-05-07 VITALS
HEART RATE: 86 BPM | SYSTOLIC BLOOD PRESSURE: 110 MMHG | BODY MASS INDEX: 31.45 KG/M2 | WEIGHT: 189 LBS | DIASTOLIC BLOOD PRESSURE: 58 MMHG

## 2020-05-07 DIAGNOSIS — N91.2 AMENORRHEA: ICD-10-CM

## 2020-05-07 LAB
AMPHETAMINE SCREEN, URINE: ABNORMAL
BARBITURATE SCREEN URINE: ABNORMAL
BASOPHILS ABSOLUTE: 0 K/UL (ref 0–0.2)
BASOPHILS RELATIVE PERCENT: 0.4 %
BENZODIAZEPINE SCREEN, URINE: ABNORMAL
CANNABINOID SCREEN URINE: POSITIVE
COCAINE METABOLITE SCREEN URINE: ABNORMAL
EOSINOPHILS ABSOLUTE: 0.3 K/UL (ref 0–0.7)
EOSINOPHILS RELATIVE PERCENT: 6.2 %
GLUCOSE BLD-MCNC: 89 MG/DL (ref 70–99)
GONADOTROPIN, CHORIONIC (HCG) QUANT: NORMAL MIU/ML
HBA1C MFR BLD: 5.1 % (ref 4.8–5.9)
HCT VFR BLD CALC: 33.4 % (ref 37–47)
HEMOGLOBIN: 10.8 G/DL (ref 12–16)
HEPATITIS B SURFACE ANTIGEN INTERPRETATION: NORMAL
HEPATITIS C ANTIBODY INTERPRETATION: NORMAL
LYMPHOCYTES ABSOLUTE: 1.7 K/UL (ref 1–4.8)
LYMPHOCYTES RELATIVE PERCENT: 39.9 %
Lab: ABNORMAL
MCH RBC QN AUTO: 29 PG (ref 27–31.3)
MCHC RBC AUTO-ENTMCNC: 32.4 % (ref 33–37)
MCV RBC AUTO: 89.4 FL (ref 82–100)
METHADONE SCREEN, URINE: ABNORMAL
MONOCYTES ABSOLUTE: 0.3 K/UL (ref 0.2–0.8)
MONOCYTES RELATIVE PERCENT: 7.6 %
NEUTROPHILS ABSOLUTE: 1.9 K/UL (ref 1.4–6.5)
NEUTROPHILS RELATIVE PERCENT: 45.9 %
OPIATE SCREEN URINE: ABNORMAL
OXYCODONE URINE: ABNORMAL
PDW BLD-RTO: 17.4 % (ref 11.5–14.5)
PHENCYCLIDINE SCREEN URINE: ABNORMAL
PLATELET # BLD: 298 K/UL (ref 130–400)
PROPOXYPHENE SCREEN: ABNORMAL
RBC # BLD: 3.74 M/UL (ref 4.2–5.4)
RUBELLA ANTIBODY IGG: 128.3 IU/ML
TSH SERPL DL<=0.05 MIU/L-ACNC: 0.81 UIU/ML (ref 0.44–3.86)
WBC # BLD: 4.2 K/UL (ref 4.8–10.8)

## 2020-05-07 PROCEDURE — G8419 CALC BMI OUT NRM PARAM NOF/U: HCPCS | Performed by: OBSTETRICS & GYNECOLOGY

## 2020-05-07 PROCEDURE — 36415 COLL VENOUS BLD VENIPUNCTURE: CPT | Performed by: OBSTETRICS & GYNECOLOGY

## 2020-05-07 PROCEDURE — 99204 OFFICE O/P NEW MOD 45 MIN: CPT | Performed by: OBSTETRICS & GYNECOLOGY

## 2020-05-07 PROCEDURE — G8427 DOCREV CUR MEDS BY ELIG CLIN: HCPCS | Performed by: OBSTETRICS & GYNECOLOGY

## 2020-05-07 PROCEDURE — 4004F PT TOBACCO SCREEN RCVD TLK: CPT | Performed by: OBSTETRICS & GYNECOLOGY

## 2020-05-07 RX ORDER — PNV NO.95/FERROUS FUM/FOLIC AC 28MG-0.8MG
1 TABLET ORAL DAILY
Qty: 30 TABLET | Refills: 11 | Status: SHIPPED | OUTPATIENT
Start: 2020-05-07 | End: 2021-12-01

## 2020-05-07 RX ORDER — ONDANSETRON 8 MG/1
8 TABLET, ORALLY DISINTEGRATING ORAL EVERY 8 HOURS PRN
Qty: 21 TABLET | Refills: 3 | Status: ON HOLD | OUTPATIENT
Start: 2020-05-07 | End: 2020-12-16 | Stop reason: HOSPADM

## 2020-05-07 ASSESSMENT — ENCOUNTER SYMPTOMS
BLOOD IN STOOL: 0
CONSTIPATION: 0
ABDOMINAL PAIN: 0
NAUSEA: 0
VOMITING: 0
COLOR CHANGE: 0
WHEEZING: 0
SHORTNESS OF BREATH: 0
TROUBLE SWALLOWING: 0
SORE THROAT: 0
BACK PAIN: 0
COUGH: 0
CHEST TIGHTNESS: 0
VOICE CHANGE: 0
ABDOMINAL DISTENTION: 0

## 2020-05-07 NOTE — PROGRESS NOTES
Subjective: As an [de-identified]is a 61-year-old female  2 para 1-0-0-1. She had a  section in 2017 for a nonreassuring fetal heart rate. She is here with amenorrhea her last menstrual period was about 6 weeks ago. She is not having any problems other than nausea. She has occasional vomiting she is not having any bleeding no abnormal discharge no fever no difficulty with bowel or bladder function. Past medical history is negative. Past surgical history tonsillectomy and  section. Allergies none social history she is a cigarette smoker but she cut down from a pack a day to about 4 to 5 cigarettes/day when she found out she was pregnant. Obviously we encouraged her to discontinue that completely. Patient ID: Mayra Gamboa is a 27 y.o. female. HPI    Review of Systems   Constitutional: Negative for activity change, appetite change, fatigue and unexpected weight change. HENT: Negative for dental problem, ear pain, hearing loss, nosebleeds, sore throat, trouble swallowing and voice change. Eyes: Negative for visual disturbance. Respiratory: Negative for cough, chest tightness, shortness of breath and wheezing. Cardiovascular: Negative for chest pain and palpitations. Gastrointestinal: Negative for abdominal distention, abdominal pain, blood in stool, constipation, nausea and vomiting. Endocrine: Negative for cold intolerance, heat intolerance, polydipsia, polyphagia and polyuria. Genitourinary: Negative for difficulty urinating, dyspareunia, dysuria, flank pain, frequency, genital sores, hematuria, menstrual problem, pelvic pain, urgency, vaginal bleeding, vaginal discharge and vaginal pain. Musculoskeletal: Negative for arthralgias, back pain, joint swelling and myalgias. Skin: Negative for color change and rash. Allergic/Immunologic: Negative for environmental allergies, food allergies and immunocompromised state.    Neurological: Negative for dizziness,

## 2020-05-08 LAB
ABO/RH: NORMAL
ANTIBODY SCREEN: NORMAL
CLUE CELLS: NORMAL
RPR: NORMAL
TRICHOMONAS PREP: NORMAL
TRICHOMONAS VAGINALIS SCREEN: NEGATIVE
YEAST WET PREP: NORMAL

## 2020-05-09 LAB — URINE CULTURE, ROUTINE: NORMAL

## 2020-05-10 LAB — VZV IGG SER QL IA: 647.8 IV

## 2020-05-12 ENCOUNTER — HOSPITAL ENCOUNTER (OUTPATIENT)
Dept: ULTRASOUND IMAGING | Age: 25
Discharge: HOME | End: 2020-05-14
Payer: COMMERCIAL

## 2020-05-12 PROCEDURE — 76801 OB US < 14 WKS SINGLE FETUS: CPT

## 2020-05-22 LAB
C. TRACHOMATIS DNA,THIN PREP: NEGATIVE
N. GONORRHOEAE DNA, THIN PREP: NEGATIVE

## 2020-06-02 ENCOUNTER — INITIAL PRENATAL (OUTPATIENT)
Dept: OBGYN CLINIC | Age: 25
End: 2020-06-02
Payer: COMMERCIAL

## 2020-06-02 VITALS
WEIGHT: 177 LBS | HEART RATE: 98 BPM | BODY MASS INDEX: 29.45 KG/M2 | SYSTOLIC BLOOD PRESSURE: 116 MMHG | DIASTOLIC BLOOD PRESSURE: 62 MMHG

## 2020-06-02 DIAGNOSIS — Z34.81 SUPERVISION OF NORMAL INTRAUTERINE PREGNANCY IN MULTIGRAVIDA IN FIRST TRIMESTER: ICD-10-CM

## 2020-06-02 PROBLEM — R10.2 PELVIC AND PERINEAL PAIN: Status: ACTIVE | Noted: 2018-05-20

## 2020-06-02 PROBLEM — J45.909 ASTHMA: Status: ACTIVE | Noted: 2018-05-20

## 2020-06-02 PROBLEM — Z86.19 PERSONAL HISTORY OF OTHER INFECTIOUS AND PARASITIC DISEASES: Status: ACTIVE | Noted: 2018-05-20

## 2020-06-02 PROBLEM — N89.8 OTHER SPECIFIED NONINFLAMMATORY DISORDERS OF VAGINA: Status: ACTIVE | Noted: 2018-05-20

## 2020-06-02 PROBLEM — F17.210 CIGARETTE NICOTINE DEPENDENCE WITHOUT COMPLICATION: Status: ACTIVE | Noted: 2018-05-20

## 2020-06-02 LAB
BILIRUBIN URINE: NEGATIVE
BLOOD, URINE: NEGATIVE
CLARITY: CLEAR
COLOR: YELLOW
GLUCOSE URINE: NEGATIVE MG/DL
KETONES, URINE: 15 MG/DL
LEUKOCYTE ESTERASE, URINE: NEGATIVE
NITRITE, URINE: NEGATIVE
PH UA: 7 (ref 5–9)
PROTEIN UA: ABNORMAL MG/DL
SPECIFIC GRAVITY UA: 1.02 (ref 1–1.03)
UROBILINOGEN, URINE: 1 E.U./DL

## 2020-06-02 PROCEDURE — 99213 OFFICE O/P EST LOW 20 MIN: CPT | Performed by: OBSTETRICS & GYNECOLOGY

## 2020-06-02 PROCEDURE — 4004F PT TOBACCO SCREEN RCVD TLK: CPT | Performed by: OBSTETRICS & GYNECOLOGY

## 2020-06-02 PROCEDURE — G8427 DOCREV CUR MEDS BY ELIG CLIN: HCPCS | Performed by: OBSTETRICS & GYNECOLOGY

## 2020-06-02 PROCEDURE — G8419 CALC BMI OUT NRM PARAM NOF/U: HCPCS | Performed by: OBSTETRICS & GYNECOLOGY

## 2020-06-15 LAB
EER NON INVASIVE PRENATAL ANEUPLOIDY: NORMAL
FETAL FRACTION: 7 %
FETAL GENDER: NORMAL
FETUS COUNT: 1
GESTATIONAL AGE (DAYS): 1
GESTATIONAL AGE(WEEKS): 11
HEIGHT: NORMAL
Lab: NORMAL
MATERNAL WEIGHT: 177
MONOSOMY X: NORMAL
REPORT FETUS GENDER: YES
TRIPLOIDY (VANISHING TWIN): NORMAL
TRISOMY 13 RISK: NORMAL
TRISOMY 18 RISK ASSESSMENT: NORMAL
TRISOMY 21 RISK: NORMAL

## 2020-06-30 ENCOUNTER — ROUTINE PRENATAL (OUTPATIENT)
Dept: OBGYN CLINIC | Age: 25
End: 2020-06-30
Payer: COMMERCIAL

## 2020-06-30 VITALS
SYSTOLIC BLOOD PRESSURE: 102 MMHG | DIASTOLIC BLOOD PRESSURE: 76 MMHG | BODY MASS INDEX: 28.46 KG/M2 | WEIGHT: 171 LBS | HEART RATE: 96 BPM

## 2020-06-30 PROCEDURE — 4004F PT TOBACCO SCREEN RCVD TLK: CPT | Performed by: OBSTETRICS & GYNECOLOGY

## 2020-06-30 PROCEDURE — G8427 DOCREV CUR MEDS BY ELIG CLIN: HCPCS | Performed by: OBSTETRICS & GYNECOLOGY

## 2020-06-30 PROCEDURE — 99213 OFFICE O/P EST LOW 20 MIN: CPT | Performed by: OBSTETRICS & GYNECOLOGY

## 2020-06-30 PROCEDURE — G8419 CALC BMI OUT NRM PARAM NOF/U: HCPCS | Performed by: OBSTETRICS & GYNECOLOGY

## 2020-06-30 RX ORDER — FAMOTIDINE 20 MG/1
20 TABLET, FILM COATED ORAL 2 TIMES DAILY
Qty: 60 TABLET | Refills: 3 | Status: ON HOLD | OUTPATIENT
Start: 2020-06-30 | End: 2020-12-16 | Stop reason: HOSPADM

## 2020-06-30 RX ORDER — FAMOTIDINE 20 MG/1
20 TABLET, FILM COATED ORAL 2 TIMES DAILY
Qty: 60 TABLET | Refills: 3 | Status: SHIPPED | OUTPATIENT
Start: 2020-06-30 | End: 2020-06-30 | Stop reason: SDUPTHER

## 2020-07-04 ENCOUNTER — HOSPITAL ENCOUNTER (EMERGENCY)
Age: 25
Discharge: HOME | End: 2020-07-04
Attending: STUDENT IN AN ORGANIZED HEALTH CARE EDUCATION/TRAINING PROGRAM
Payer: COMMERCIAL

## 2020-07-04 VITALS
SYSTOLIC BLOOD PRESSURE: 127 MMHG | BODY MASS INDEX: 28.49 KG/M2 | RESPIRATION RATE: 18 BRPM | DIASTOLIC BLOOD PRESSURE: 89 MMHG | HEIGHT: 65 IN | TEMPERATURE: 98.4 F | WEIGHT: 171 LBS | HEART RATE: 75 BPM | OXYGEN SATURATION: 98 %

## 2020-07-04 LAB
ALBUMIN SERPL-MCNC: 3.7 G/DL (ref 3.5–4.6)
ALP BLD-CCNC: 51 U/L (ref 40–130)
ALT SERPL-CCNC: 8 U/L (ref 0–33)
AMPHETAMINE SCREEN, URINE: ABNORMAL
ANION GAP SERPL CALCULATED.3IONS-SCNC: 13 MEQ/L (ref 9–15)
APTT: 33.1 SEC (ref 24.4–36.8)
AST SERPL-CCNC: 10 U/L (ref 0–35)
BACTERIA: NEGATIVE /HPF
BARBITURATE SCREEN URINE: ABNORMAL
BASOPHILS ABSOLUTE: 0 K/UL (ref 0–0.2)
BASOPHILS RELATIVE PERCENT: 0.4 %
BENZODIAZEPINE SCREEN, URINE: ABNORMAL
BILIRUB SERPL-MCNC: 0.3 MG/DL (ref 0.2–0.7)
BILIRUBIN URINE: NEGATIVE
BLOOD, URINE: NEGATIVE
BUN BLDV-MCNC: 4 MG/DL (ref 6–20)
CALCIUM SERPL-MCNC: 8.9 MG/DL (ref 8.5–9.9)
CANNABINOID SCREEN URINE: POSITIVE
CHLORIDE BLD-SCNC: 102 MEQ/L (ref 95–107)
CLARITY: CLEAR
CO2: 21 MEQ/L (ref 20–31)
COCAINE METABOLITE SCREEN URINE: ABNORMAL
COLOR: YELLOW
CREAT SERPL-MCNC: 0.48 MG/DL (ref 0.5–0.9)
EKG ATRIAL RATE: 87 BPM
EKG P AXIS: 48 DEGREES
EKG P-R INTERVAL: 134 MS
EKG Q-T INTERVAL: 352 MS
EKG QRS DURATION: 86 MS
EKG QTC CALCULATION (BAZETT): 423 MS
EKG R AXIS: 42 DEGREES
EKG T AXIS: -2 DEGREES
EKG VENTRICULAR RATE: 87 BPM
EOSINOPHILS ABSOLUTE: 0.3 K/UL (ref 0–0.7)
EOSINOPHILS RELATIVE PERCENT: 4.4 %
EPITHELIAL CELLS, UA: NORMAL /HPF (ref 0–5)
GFR AFRICAN AMERICAN: >60
GFR NON-AFRICAN AMERICAN: >60
GLOBULIN: 2.9 G/DL (ref 2.3–3.5)
GLUCOSE BLD-MCNC: 99 MG/DL (ref 70–99)
GLUCOSE URINE: NEGATIVE MG/DL
GONADOTROPIN, CHORIONIC (HCG) QUANT: NORMAL MIU/ML
HCT VFR BLD CALC: 34 % (ref 37–47)
HEMOGLOBIN: 11.4 G/DL (ref 12–16)
HYALINE CASTS: NORMAL /HPF (ref 0–5)
INR BLD: 0.9
KETONES, URINE: ABNORMAL MG/DL
LEUKOCYTE ESTERASE, URINE: ABNORMAL
LYMPHOCYTES ABSOLUTE: 2 K/UL (ref 1–4.8)
LYMPHOCYTES RELATIVE PERCENT: 32.2 %
Lab: ABNORMAL
MCH RBC QN AUTO: 31.2 PG (ref 27–31.3)
MCHC RBC AUTO-ENTMCNC: 33.6 % (ref 33–37)
MCV RBC AUTO: 92.7 FL (ref 82–100)
METHADONE SCREEN, URINE: ABNORMAL
MONOCYTES ABSOLUTE: 0.3 K/UL (ref 0.2–0.8)
MONOCYTES RELATIVE PERCENT: 5.5 %
NEUTROPHILS ABSOLUTE: 3.5 K/UL (ref 1.4–6.5)
NEUTROPHILS RELATIVE PERCENT: 57.5 %
NITRITE, URINE: NEGATIVE
OPIATE SCREEN URINE: ABNORMAL
OXYCODONE URINE: ABNORMAL
PDW BLD-RTO: 16.8 % (ref 11.5–14.5)
PH UA: 6 (ref 5–9)
PHENCYCLIDINE SCREEN URINE: ABNORMAL
PLATELET # BLD: 234 K/UL (ref 130–400)
POTASSIUM SERPL-SCNC: 3.6 MEQ/L (ref 3.4–4.9)
PROPOXYPHENE SCREEN: ABNORMAL
PROTEIN UA: NEGATIVE MG/DL
PROTHROMBIN TIME: 12.7 SEC (ref 12.3–14.9)
RBC # BLD: 3.66 M/UL (ref 4.2–5.4)
RBC UA: NORMAL /HPF (ref 0–5)
SODIUM BLD-SCNC: 136 MEQ/L (ref 135–144)
SPECIFIC GRAVITY UA: 1.01 (ref 1–1.03)
TOTAL PROTEIN: 6.6 G/DL (ref 6.3–8)
URINE REFLEX TO CULTURE: ABNORMAL
UROBILINOGEN, URINE: 1 E.U./DL
WBC # BLD: 6.1 K/UL (ref 4.8–10.8)
WBC UA: NORMAL /HPF (ref 0–5)

## 2020-07-04 PROCEDURE — 93005 ELECTROCARDIOGRAM TRACING: CPT | Performed by: STUDENT IN AN ORGANIZED HEALTH CARE EDUCATION/TRAINING PROGRAM

## 2020-07-04 PROCEDURE — 81001 URINALYSIS AUTO W/SCOPE: CPT

## 2020-07-04 PROCEDURE — 96360 HYDRATION IV INFUSION INIT: CPT

## 2020-07-04 PROCEDURE — 80307 DRUG TEST PRSMV CHEM ANLYZR: CPT

## 2020-07-04 PROCEDURE — 84702 CHORIONIC GONADOTROPIN TEST: CPT

## 2020-07-04 PROCEDURE — 36415 COLL VENOUS BLD VENIPUNCTURE: CPT

## 2020-07-04 PROCEDURE — 2580000003 HC RX 258: Performed by: STUDENT IN AN ORGANIZED HEALTH CARE EDUCATION/TRAINING PROGRAM

## 2020-07-04 PROCEDURE — 99284 EMERGENCY DEPT VISIT MOD MDM: CPT

## 2020-07-04 PROCEDURE — 85610 PROTHROMBIN TIME: CPT

## 2020-07-04 PROCEDURE — 85730 THROMBOPLASTIN TIME PARTIAL: CPT

## 2020-07-04 PROCEDURE — 80053 COMPREHEN METABOLIC PANEL: CPT

## 2020-07-04 PROCEDURE — 85025 COMPLETE CBC W/AUTO DIFF WBC: CPT

## 2020-07-04 RX ORDER — 0.9 % SODIUM CHLORIDE 0.9 %
1000 INTRAVENOUS SOLUTION INTRAVENOUS ONCE
Status: COMPLETED | OUTPATIENT
Start: 2020-07-04 | End: 2020-07-04

## 2020-07-04 RX ADMIN — SODIUM CHLORIDE 1000 ML: 9 INJECTION, SOLUTION INTRAVENOUS at 17:07

## 2020-07-04 ASSESSMENT — ENCOUNTER SYMPTOMS
VOMITING: 0
TROUBLE SWALLOWING: 0
BACK PAIN: 0
NAUSEA: 0
CHEST TIGHTNESS: 0
SINUS PRESSURE: 0
ABDOMINAL PAIN: 0
DIARRHEA: 0
SHORTNESS OF BREATH: 0
COUGH: 0

## 2020-07-04 NOTE — ED TRIAGE NOTES
Pt came to the ED From work with Significant other. Pt passed out at work. Pt is 15 weeks pregnant. Pt denies pain. Pt denies fever, sob, cough, or n/v/d. Pt is afebrile. Pt is A&O x 4. Pt's breathing and respirations are unlabored and equal. Pt's skin is warm and dry. MSP's intact.

## 2020-07-04 NOTE — ED PROVIDER NOTES
[facility] ED  eMERGENCY dEPARTMENT eNCOUnter      Pt Name: Geovanny Mason  MRN: [id number]  [place] 1995  Date of evaluation: 7/4/2020  Provider: Ashlee Summers, [address] [place] [address]       Chief Complaint   Patient presents with    Loss of Consciousness     pt states just standing and feeling faint. no LOC. pt 15weeks pregnant         HISTORY OF PRESENT ILLNESS   (Location/Symptom, Timing/Onset,Context/Setting, Quality, Duration, Modifying Factors, Severity)  Note limiting factors. Geovanny Mason is a 27 y.o. female who presents to the emergency department with complaints of nearly passing out. Patient is 15 weeks pregnant admits that she was at work and had not consume much water or other fluids today. Patient states she felt very faint like she was going to pass out and this lasted for a couple of minutes. Patient denies any fever, chills, cough, nausea, vomiting or diarrhea. Patient states she is approximately 15 weeks. She has established prenatal care and has seen an OB/GYN Dr. Tiffanie Vides. Patient denies any black, bloody or tarry stools. Patient denies any history of diabetes. Patient denies any history of blood clots or heart disease. Patient states that she is feeling better now. Sample that is provided sitting in the room is extremely dark yellow in color. HPI    NursingNotes were reviewed. REVIEW OF SYSTEMS    (2-9 systems for level 4, 10 or more for level 5)     Review of Systems   Constitutional: Negative for activity change, appetite change, chills, fever and unexpected weight change. HENT: Negative for drooling, ear pain, nosebleeds, sinus pressure and trouble swallowing. Respiratory: Negative for cough, chest tightness and shortness of breath. Cardiovascular: Negative for chest pain and leg swelling. Gastrointestinal: Negative for abdominal pain, diarrhea, nausea and vomiting. Endocrine: Negative for polydipsia and polyphagia.    Genitourinary: Negative for dysuria, flank pain and frequency. Musculoskeletal: Negative for back pain and myalgias. Skin: Negative for pallor and rash. Neurological: Positive for light-headedness. Negative for syncope, weakness and headaches. Hematological: Does not bruise/bleed easily. All other systems reviewed and are negative. Except as noted above the remainder of the review of systems was reviewed and negative. PAST MEDICAL HISTORY     Past Medical History:   Diagnosis Date    Anemia     Asthma 2018         SURGICALHISTORY       Past Surgical History:   Procedure Laterality Date     SECTION N/A 2017     SECTION performed by Aliya Gallardo [facility] at [facility]    TONSILLECTOMY AND ADENOIDECTOMY           CURRENT MEDICATIONS       Discharge Medication List as of 2020  6:32 PM      CONTINUE these medications which have NOT CHANGED    Details   Prenatal Vit-Fe Fumarate-FA (PRENATAL VITAMIN) 27-0.8 MG TABS Take 1 tablet by mouth daily Please dispense what patient's insurance will cover. , Disp-30 tablet, R-11Normal      ondansetron (ZOFRAN-ODT) 8 MG TBDP disintegrating tablet Place 1 tablet under the tongue every 8 hours as needed for Nausea or Vomiting, Disp-21 tablet, R-3Normal      famotidine (PEPCID) 20 MG tablet Take 1 tablet by mouth 2 times daily, Disp-60 tablet, R-3Normal             ALLERGIES     Patient has no known allergies.     FAMILY HISTORY       Family History   Problem Relation Age of Onset    Breast Cancer Neg Hx           SOCIAL HISTORY       Social History     Socioeconomic History    Marital status: Single     Spouse name: None    Number of children: None    Years of education: None    Highest education level: None   Occupational History    None   Social Needs    Financial resource strain: None    Food insecurity     Worry: None     Inability: None    Transportation needs     Medical: None     Non-medical: None   Tobacco Use    Smoking status: Current Every Day Smoker     Packs/day: 1.00     Types: Cigarettes    Smokeless tobacco: Current User   Substance and Sexual Activity    Alcohol use: No    Drug use: No    Sexual activity: Yes     Partners: Male   Lifestyle    Physical activity     Days per week: None     Minutes per session: None    Stress: None   Relationships    Social connections     Talks on phone: None     Gets together: None     Attends Uatsdin service: None     Active member of club or organization: None     Attends meetings of clubs or organizations: None     Relationship status: None    Intimate partner violence     Fear of current or ex partner: None     Emotionally abused: None     Physically abused: None     Forced sexual activity: None   Other Topics Concern    None   Social History Narrative    None       SCREENINGS      @FLOW([id number])@      PHYSICAL EXAM    (up to 7 for level 4, 8 or more for level 5)     ED Triage Vitals   BP Temp Temp src Pulse Resp SpO2 Height Weight   07/04/20 1635 07/04/20 1634 -- 07/04/20 1634 07/04/20 1634 07/04/20 1634 07/04/20 1634 07/04/20 1634   114/71 98.4 °F (36.9 °C)  76 18 100 % 5' 5\" (1.651 m) 171 lb (77.6 kg)       Physical Exam  Vitals signs and nursing note reviewed. Constitutional:       General: She is awake. She is not in acute distress. Appearance: Normal appearance. She is well-developed and normal weight. She is not ill-appearing, toxic-appearing or diaphoretic. Comments: No photophobia. No phonophobia. HENT:      Head: Normocephalic and atraumatic. No Joyner's sign. Right Ear: External ear normal.      Left Ear: External ear normal.      Nose: Nose normal. No congestion or rhinorrhea. Mouth/Throat:      Mouth: Mucous membranes are moist.      Pharynx: Oropharynx is clear. No oropharyngeal exudate or posterior oropharyngeal erythema. Eyes:      General: No scleral icterus. Right eye: No foreign body or discharge. Left eye: No discharge. Extraocular Movements: Extraocular movements intact. Conjunctiva/sclera: Conjunctivae normal.      Left eye: No exudate. Pupils: Pupils are equal, round, and reactive to light. Neck:      Musculoskeletal: Normal range of motion and neck supple. No neck rigidity. Vascular: No JVD. Trachea: No tracheal deviation. Comments: No meningismus. Cardiovascular:      Rate and Rhythm: Normal rate and regular rhythm. Heart sounds: Normal heart sounds. Heart sounds not distant. No murmur. No friction rub. No gallop. Pulmonary:      Effort: Pulmonary effort is normal. No respiratory distress. Breath sounds: Normal breath sounds. No stridor. No wheezing, rhonchi or rales. Chest:      Chest wall: No tenderness. Abdominal:      General: Abdomen is flat. Bowel sounds are normal. There is no distension. Palpations: Abdomen is soft. There is no mass. Tenderness: There is no abdominal tenderness. There is no right CVA tenderness, left CVA tenderness, guarding or rebound. Hernia: No hernia is present. Comments: Gravid. Musculoskeletal: Normal range of motion. General: No swelling, tenderness, deformity or signs of injury. Lymphadenopathy:      Head:      Right side of head: No submental adenopathy. Left side of head: No submental adenopathy. Skin:     General: Skin is warm and dry. Capillary Refill: Capillary refill takes less than 2 seconds. Coloration: Skin is not jaundiced or pale. Findings: No bruising, erythema, lesion or rash. Neurological:      General: No focal deficit present. Mental Status: She is alert and oriented to person, place, and time. Mental status is at baseline. Cranial Nerves: No cranial nerve deficit. Sensory: No sensory deficit. Motor: No weakness. Coordination: Coordination normal.      Deep Tendon Reflexes: Reflexes are normal and symmetric.    Psychiatric:         Mood and Affect: Mood normal.         Behavior: Behavior normal. Behavior is cooperative. Thought Content: Thought content normal.         Judgment: Judgment normal.         DIAGNOSTIC RESULTS     EKG: All EKG's are interpreted by the Emergency Department Physician who either signs or Co-signsthis chart in the absence of a cardiologist.    EKG: Normal sinus rhythm at 87 bpm, normal axis, diffuse artifact throughout the EKG, inverted T wave in III, good R wave transition the precordial leads, QT interval 352 ms, no PVCs. RADIOLOGY:   Non-plain filmimages such as CT, Ultrasound and MRI are read by the radiologist. Plain radiographic images are visualized and preliminarily interpreted by the emergency physician with the below findings:        Interpretation per the Radiologist below, if available at the time ofthis note:    No orders to display         ED BEDSIDE ULTRASOUND:   Performed by ED Physician -intrauterine pregnancy fetus is moving and active. Unable to calculate the fetal heart rate due to movement that is frequent.     LABS:  Labs Reviewed   CBC WITH AUTO DIFFERENTIAL - Abnormal; Notable for the following components:       Result Value    RBC 3.66 (*)     Hemoglobin 11.4 (*)     Hematocrit 34.0 (*)     RDW 16.8 (*)     All other components within normal limits   COMPREHENSIVE METABOLIC PANEL - Abnormal; Notable for the following components:    BUN 4 (*)     CREATININE 0.48 (*)     All other components within normal limits   URINE RT REFLEX TO CULTURE - Abnormal; Notable for the following components:    Ketones, Urine TRACE (*)     Leukocyte Esterase, Urine TRACE (*)     All other components within normal limits   URINE DRUG SCREEN - Abnormal; Notable for the following components:    Cannabinoid Scrn, Ur POSITIVE (*)     All other components within normal limits   PROTIME-INR   APTT   HCG, QUANTITATIVE, PREGNANCY   MICROSCOPIC URINALYSIS   POC PREGNANCY UR-QUAL       All other labs were within normal range or not returned as of this dictation. EMERGENCY DEPARTMENT COURSE and DIFFERENTIAL DIAGNOSIS/MDM:   Vitals:    Vitals:    07/04/20 1634 07/04/20 1635 07/04/20 1644 07/04/20 1809   BP:  114/71  127/89   Pulse: 76   75   Resp: 18   18   Temp: 98.4 °F (36.9 °C)      SpO2: 100%  100% 98%   Weight: 171 lb (77.6 kg)      Height: 5' 5\" (1.651 m)              MDM  Is marijuana positive. Patient is a cigarette smoker. Patient is G2 para 1. Patient has established prenatal care. Patient was advised to stop all forms of smoking whether marijuana or cigarettes. The patient is not consuming nearly enough fluids during the day. She was given a liter of IV fluids and states that she feels completely better. The patient tolerates oral food well and her EKG is unremarkable. Blood work was checked to assess for liver function, kidney function, platelet count and also to assess for anemia. Ketones in the urine indicate dehydration. On reexamination the findings were discussed with the patient and her male significant other. They verbalized understanding of care and of no further questions. CONSULTS:  None    PROCEDURES:  Unless otherwise noted below, none     Procedures    FINAL IMPRESSION      1. Near syncope    2. Pregnancy, unspecified gestational age    1. Marijuana abuse    4.  Dehydration          DISPOSITION/PLAN   DISPOSITION Decision To Discharge 07/04/2020 06:26:02 PM      PATIENT REFERRED TO:  Jaime Jose PA-C  [address] [place] [address] [address] [place] [address]  [phone]    Schedule an appointment as soon as possible for a visit in 1 day      Aren Young, [address] [place] [address]  [phone]    Call in 1 day        DISCHARGE MEDICATIONS:  Discharge Medication List as of 7/4/2020  6:32 PM             (Please note that portions of this note were completed with a voice recognition program.  Efforts were made to edit the dictations but occasionally words are mis-transcribed.)    [address] Hays, DO (electronically signed)  Attending Emergency Physician          Adeola Doherty, DO  07/04/20 [address] Yusef Minor, DO  07/04/20 0792

## 2020-07-06 PROCEDURE — 93010 ELECTROCARDIOGRAM REPORT: CPT | Performed by: INTERNAL MEDICINE

## 2020-07-30 ENCOUNTER — ROUTINE PRENATAL (OUTPATIENT)
Dept: OBGYN CLINIC | Age: 25
End: 2020-07-30
Payer: COMMERCIAL

## 2020-07-30 VITALS
BODY MASS INDEX: 28.29 KG/M2 | WEIGHT: 170 LBS | DIASTOLIC BLOOD PRESSURE: 66 MMHG | HEART RATE: 97 BPM | SYSTOLIC BLOOD PRESSURE: 110 MMHG

## 2020-07-30 PROCEDURE — 99213 OFFICE O/P EST LOW 20 MIN: CPT | Performed by: OBSTETRICS & GYNECOLOGY

## 2020-07-30 PROCEDURE — G8419 CALC BMI OUT NRM PARAM NOF/U: HCPCS | Performed by: OBSTETRICS & GYNECOLOGY

## 2020-07-30 PROCEDURE — G8427 DOCREV CUR MEDS BY ELIG CLIN: HCPCS | Performed by: OBSTETRICS & GYNECOLOGY

## 2020-07-30 PROCEDURE — 4004F PT TOBACCO SCREEN RCVD TLK: CPT | Performed by: OBSTETRICS & GYNECOLOGY

## 2020-07-30 RX ORDER — OMEPRAZOLE 20 MG/1
20 CAPSULE, DELAYED RELEASE ORAL
Qty: 30 CAPSULE | Refills: 5 | Status: SHIPPED | OUTPATIENT
Start: 2020-07-30 | End: 2020-08-05 | Stop reason: SDUPTHER

## 2020-07-30 NOTE — PROGRESS NOTES
Patient here for prenatal visit at 24 weeks and 3 days she is doing well. No complaints. Her acid reflux has gotten better but she still wanted to have something to decrease the acid production but the Pepcid is on back order so we will switch her to Prilosec. Positive fetal movement. Fetal heart rate is 143. No loss of fluid vaginal bleeding or uterine activity. Review of systems is negative. No change in her history medical surgical family or social.  IUP at 24 weeks. GERD. Plan ultrasound at 20 weeks for anatomy survey and Prilosec 20 mg daily for reflux.   Follow-up in 4 weeks

## 2020-08-05 ENCOUNTER — TELEPHONE (OUTPATIENT)
Dept: OBGYN CLINIC | Age: 25
End: 2020-08-05

## 2020-08-05 RX ORDER — OMEPRAZOLE 20 MG/1
20 CAPSULE, DELAYED RELEASE ORAL
Qty: 30 CAPSULE | Refills: 5 | Status: ON HOLD | OUTPATIENT
Start: 2020-08-05 | End: 2020-12-16 | Stop reason: HOSPADM

## 2020-08-17 ENCOUNTER — HOSPITAL ENCOUNTER (OUTPATIENT)
Dept: ULTRASOUND IMAGING | Age: 25
Discharge: HOME | End: 2020-08-19
Payer: COMMERCIAL

## 2020-08-17 PROCEDURE — 76805 OB US >/= 14 WKS SNGL FETUS: CPT

## 2020-09-03 ENCOUNTER — ROUTINE PRENATAL (OUTPATIENT)
Dept: OBGYN CLINIC | Age: 25
End: 2020-09-03
Payer: COMMERCIAL

## 2020-09-03 VITALS
DIASTOLIC BLOOD PRESSURE: 58 MMHG | WEIGHT: 172 LBS | HEART RATE: 82 BPM | SYSTOLIC BLOOD PRESSURE: 118 MMHG | BODY MASS INDEX: 28.62 KG/M2

## 2020-09-03 PROCEDURE — G8427 DOCREV CUR MEDS BY ELIG CLIN: HCPCS | Performed by: OBSTETRICS & GYNECOLOGY

## 2020-09-03 PROCEDURE — G8419 CALC BMI OUT NRM PARAM NOF/U: HCPCS | Performed by: OBSTETRICS & GYNECOLOGY

## 2020-09-03 PROCEDURE — 4004F PT TOBACCO SCREEN RCVD TLK: CPT | Performed by: OBSTETRICS & GYNECOLOGY

## 2020-09-03 PROCEDURE — 99213 OFFICE O/P EST LOW 20 MIN: CPT | Performed by: OBSTETRICS & GYNECOLOGY

## 2020-09-03 NOTE — PROGRESS NOTES
Patient here for prenatal visit at 23 weeks and 3 days. She has no complaints. Recent ultrasound had some sub-visualized areas so that will be reordered  Positive fetal movement. Fetal heart rate 150. No loss of fluid vaginal bleeding or uterine activity. Review of systems negative no change in her history. IUP at 24 weeks.   Plan \"  \"We will

## 2020-09-19 ENCOUNTER — HOSPITAL ENCOUNTER (OUTPATIENT)
Age: 25
Discharge: HOME | End: 2020-09-19
Attending: OBSTETRICS & GYNECOLOGY | Admitting: OBSTETRICS & GYNECOLOGY
Payer: COMMERCIAL

## 2020-09-19 VITALS
RESPIRATION RATE: 20 BRPM | DIASTOLIC BLOOD PRESSURE: 68 MMHG | TEMPERATURE: 98.1 F | SYSTOLIC BLOOD PRESSURE: 140 MMHG | HEART RATE: 85 BPM

## 2020-09-19 LAB
ALBUMIN SERPL-MCNC: 3.1 G/DL (ref 3.5–4.6)
ALP BLD-CCNC: 79 U/L (ref 40–130)
ALT SERPL-CCNC: 10 U/L (ref 0–33)
AMPHETAMINE SCREEN, URINE: ABNORMAL
ANION GAP SERPL CALCULATED.3IONS-SCNC: 9 MEQ/L (ref 9–15)
AST SERPL-CCNC: 14 U/L (ref 0–35)
BARBITURATE SCREEN URINE: ABNORMAL
BASOPHILS ABSOLUTE: 0 K/UL (ref 0–0.2)
BASOPHILS RELATIVE PERCENT: 0.7 %
BENZODIAZEPINE SCREEN, URINE: ABNORMAL
BILIRUB SERPL-MCNC: <0.2 MG/DL (ref 0.2–0.7)
BILIRUBIN URINE: NEGATIVE
BLOOD, URINE: NEGATIVE
BUN BLDV-MCNC: 3 MG/DL (ref 6–20)
CALCIUM SERPL-MCNC: 8.3 MG/DL (ref 8.5–9.9)
CANNABINOID SCREEN URINE: POSITIVE
CHLORIDE BLD-SCNC: 104 MEQ/L (ref 95–107)
CLARITY: CLEAR
CO2: 22 MEQ/L (ref 20–31)
COCAINE METABOLITE SCREEN URINE: ABNORMAL
COLOR: YELLOW
CREAT SERPL-MCNC: 0.38 MG/DL (ref 0.5–0.9)
EOSINOPHILS ABSOLUTE: 0.2 K/UL (ref 0–0.7)
EOSINOPHILS RELATIVE PERCENT: 2.9 %
GFR AFRICAN AMERICAN: >60
GFR NON-AFRICAN AMERICAN: >60
GLOBULIN: 2.8 G/DL (ref 2.3–3.5)
GLUCOSE BLD-MCNC: 76 MG/DL (ref 70–99)
GLUCOSE URINE: NEGATIVE MG/DL
HCT VFR BLD CALC: 33.2 % (ref 37–47)
HEMOGLOBIN: 11.3 G/DL (ref 12–16)
KETONES, URINE: NEGATIVE MG/DL
LEUKOCYTE ESTERASE, URINE: NEGATIVE
LYMPHOCYTES ABSOLUTE: 2 K/UL (ref 1–4.8)
LYMPHOCYTES RELATIVE PERCENT: 29.5 %
Lab: ABNORMAL
MCH RBC QN AUTO: 33.6 PG (ref 27–31.3)
MCHC RBC AUTO-ENTMCNC: 34.2 % (ref 33–37)
MCV RBC AUTO: 98.3 FL (ref 82–100)
METHADONE SCREEN, URINE: ABNORMAL
MONOCYTES ABSOLUTE: 0.4 K/UL (ref 0.2–0.8)
MONOCYTES RELATIVE PERCENT: 6.2 %
NEUTROPHILS ABSOLUTE: 4.1 K/UL (ref 1.4–6.5)
NEUTROPHILS RELATIVE PERCENT: 60.7 %
NITRITE, URINE: NEGATIVE
OPIATE SCREEN URINE: ABNORMAL
OXYCODONE URINE: ABNORMAL
PDW BLD-RTO: 13.6 % (ref 11.5–14.5)
PH UA: 8.5 (ref 5–9)
PHENCYCLIDINE SCREEN URINE: ABNORMAL
PLATELET # BLD: 211 K/UL (ref 130–400)
POTASSIUM SERPL-SCNC: 3.7 MEQ/L (ref 3.4–4.9)
PROPOXYPHENE SCREEN: ABNORMAL
PROTEIN UA: NEGATIVE MG/DL
RBC # BLD: 3.38 M/UL (ref 4.2–5.4)
SODIUM BLD-SCNC: 135 MEQ/L (ref 135–144)
SPECIFIC GRAVITY UA: 1.01 (ref 1–1.03)
TOTAL PROTEIN: 5.9 G/DL (ref 6.3–8)
UROBILINOGEN, URINE: 1 E.U./DL
WBC # BLD: 6.8 K/UL (ref 4.8–10.8)

## 2020-09-19 PROCEDURE — 99283 EMERGENCY DEPT VISIT LOW MDM: CPT

## 2020-09-19 PROCEDURE — 59025 FETAL NON-STRESS TEST: CPT | Performed by: OBSTETRICS & GYNECOLOGY

## 2020-09-19 PROCEDURE — 85025 COMPLETE CBC W/AUTO DIFF WBC: CPT

## 2020-09-19 PROCEDURE — 81003 URINALYSIS AUTO W/O SCOPE: CPT

## 2020-09-19 PROCEDURE — 59025 FETAL NON-STRESS TEST: CPT

## 2020-09-19 PROCEDURE — 99283 EMERGENCY DEPT VISIT LOW MDM: CPT | Performed by: OBSTETRICS & GYNECOLOGY

## 2020-09-19 PROCEDURE — 80307 DRUG TEST PRSMV CHEM ANLYZR: CPT

## 2020-09-19 PROCEDURE — 80053 COMPREHEN METABOLIC PANEL: CPT

## 2020-09-19 PROCEDURE — 6370000000 HC RX 637 (ALT 250 FOR IP): Performed by: OBSTETRICS & GYNECOLOGY

## 2020-09-19 PROCEDURE — 36415 COLL VENOUS BLD VENIPUNCTURE: CPT

## 2020-09-19 RX ORDER — ACETAMINOPHEN 325 MG/1
650 TABLET ORAL ONCE
Status: COMPLETED | OUTPATIENT
Start: 2020-09-19 | End: 2020-09-19

## 2020-09-19 RX ADMIN — ACETAMINOPHEN 650 MG: 325 TABLET ORAL at 20:32

## 2020-09-19 ASSESSMENT — PAIN SCALES - GENERAL: PAINLEVEL_OUTOF10: 6

## 2020-09-19 NOTE — FLOWSHEET NOTE
Received via wheelchair to [address] [facility]  With C/O lower abdominal and back pain rating a 7 for a couple of days. Called Dr Keenan's office was told MD off until Tuesday and to go to the ER if it increased. UA obtained and sent to lab. Admits to intercourse within the last 24 hours. Denies leakage of fluid, or vaginal bleeding.

## 2020-09-20 NOTE — ED TRIAGE NOTES
Department of Obstetrics and Gynecology  Labor and Delivery  Attending Triage Note      SUBJECTIVE:  23 y.o.  Melissa Gates @ 29w4d  Presents with complaints of   Chief Complaint   Patient presents with    Lower Back Pain    Abdominal Pain       Fetal Movement    No  ROM No  Vaginal Bleeding No  Contractions No    OBJECTIVE    Vitals:  BP (!) 149/82   Pulse 86   Temp 98.1 °F (36.7 °C) (Oral)   Resp 20   LMP 03/16/2020 (Exact Date)     CONSTITUTIONAL:  awake, alert, cooperative, no apparent distress, and appears stated age    Cervix:             Dilation:  Closed         [facility]  [facility]  -5 cm         Consistency:  firm         Position:  posterior    Fetal Position:  unknown    Membranes:  Intact    Fetal heart rate:         Baseline Heart Rate:  150        Accelerations:  present       Decelerations:  none       Variability:  moderate    Contraction frequency: 0 minutes        DATA:  Labs Reviewed   URINE DRUG SCREEN - Abnormal; Notable for the following components:       Result Value    Cannabinoid Scrn, Ur POSITIVE (*)     All other components within normal limits   CBC WITH AUTO DIFFERENTIAL - Abnormal; Notable for the following components:    RBC 3.38 (*)     Hemoglobin 11.3 (*)     Hematocrit 33.2 (*)     MCH 33.6 (*)     All other components within normal limits   COMPREHENSIVE METABOLIC PANEL - Abnormal; Notable for the following components:    BUN 3 (*)     CREATININE 0.38 (*)     Calcium 8.3 (*)     Total Protein 5.9 (*)     Alb 3.1 (*)     All other components within normal limits   URINALYSIS       ASSESSMENT & PLAN:   1) IUP at 26 weeks. Low back pain of pregnancy. gestational hyperension   2) D/C to home.  Increase rest  3) Keep appointment with OB provider next week for bp check    Rl Bush

## 2020-09-21 ENCOUNTER — HOSPITAL ENCOUNTER (OUTPATIENT)
Dept: ULTRASOUND IMAGING | Age: 25
Discharge: HOME | End: 2020-09-23
Payer: COMMERCIAL

## 2020-09-21 PROCEDURE — 76815 OB US LIMITED FETUS(S): CPT

## 2020-10-01 ENCOUNTER — ROUTINE PRENATAL (OUTPATIENT)
Dept: OBGYN CLINIC | Age: 25
End: 2020-10-01
Payer: COMMERCIAL

## 2020-10-01 VITALS
BODY MASS INDEX: 28.96 KG/M2 | HEART RATE: 96 BPM | DIASTOLIC BLOOD PRESSURE: 72 MMHG | WEIGHT: 174 LBS | SYSTOLIC BLOOD PRESSURE: 128 MMHG

## 2020-10-01 DIAGNOSIS — Z34.92 PRENATAL CARE, SECOND TRIMESTER: ICD-10-CM

## 2020-10-01 LAB
GLUCOSE, 1HR PP: 100 MG/DL (ref 60–140)
HCT VFR BLD CALC: 33 % (ref 37–47)
HEMOGLOBIN: 10.7 G/DL (ref 12–16)

## 2020-10-01 PROCEDURE — 4004F PT TOBACCO SCREEN RCVD TLK: CPT | Performed by: OBSTETRICS & GYNECOLOGY

## 2020-10-01 PROCEDURE — 99213 OFFICE O/P EST LOW 20 MIN: CPT | Performed by: OBSTETRICS & GYNECOLOGY

## 2020-10-01 PROCEDURE — G8484 FLU IMMUNIZE NO ADMIN: HCPCS | Performed by: OBSTETRICS & GYNECOLOGY

## 2020-10-01 PROCEDURE — G8419 CALC BMI OUT NRM PARAM NOF/U: HCPCS | Performed by: OBSTETRICS & GYNECOLOGY

## 2020-10-01 PROCEDURE — 96372 THER/PROPH/DIAG INJ SC/IM: CPT | Performed by: OBSTETRICS & GYNECOLOGY

## 2020-10-01 PROCEDURE — G8427 DOCREV CUR MEDS BY ELIG CLIN: HCPCS | Performed by: OBSTETRICS & GYNECOLOGY

## 2020-10-01 NOTE — PROGRESS NOTES
Severiano Coleman is here for prenatal visit at 35 weeks. Dates consistent with a first trimester ultrasound which gives her a due date of the  she is doing well. No complaints. Received her RhoGam today and she is doing her glucose tolerance test.  Positive fetal movement. Fetal heart rate is 145. No loss of fluid vaginal bleeding or uterine activity. Review of systems negative no change in her history. IUP at 28 weeks previous  section for repeat. We will plan on doing that .   Patient to follow-up in 4 weeks

## 2020-10-02 RX ORDER — FERROUS SULFATE 325(65) MG
325 TABLET ORAL
Qty: 90 TABLET | Refills: 1 | Status: SHIPPED | OUTPATIENT
Start: 2020-10-02 | End: 2020-12-29 | Stop reason: SDUPTHER

## 2020-10-29 ENCOUNTER — ROUTINE PRENATAL (OUTPATIENT)
Dept: OBGYN CLINIC | Age: 25
End: 2020-10-29
Payer: COMMERCIAL

## 2020-10-29 VITALS
WEIGHT: 179 LBS | DIASTOLIC BLOOD PRESSURE: 74 MMHG | HEART RATE: 100 BPM | SYSTOLIC BLOOD PRESSURE: 120 MMHG | BODY MASS INDEX: 29.79 KG/M2

## 2020-10-29 PROCEDURE — 99213 OFFICE O/P EST LOW 20 MIN: CPT | Performed by: OBSTETRICS & GYNECOLOGY

## 2020-10-29 PROCEDURE — 4004F PT TOBACCO SCREEN RCVD TLK: CPT | Performed by: OBSTETRICS & GYNECOLOGY

## 2020-10-29 PROCEDURE — G8484 FLU IMMUNIZE NO ADMIN: HCPCS | Performed by: OBSTETRICS & GYNECOLOGY

## 2020-10-29 PROCEDURE — G8427 DOCREV CUR MEDS BY ELIG CLIN: HCPCS | Performed by: OBSTETRICS & GYNECOLOGY

## 2020-10-29 PROCEDURE — G8419 CALC BMI OUT NRM PARAM NOF/U: HCPCS | Performed by: OBSTETRICS & GYNECOLOGY

## 2020-10-29 NOTE — PROGRESS NOTES
Patient here for a prenatal visit at 26 weeks and 3 days. She is doing well. She has no complaints. She has had a previous  section. She is Rh- and got her RhoGam.  She had a GTT which was normal.  No loss of fluid vaginal bleeding or uterine activity. Positive fetal movement kick counts bid good fetal heart rate is 145. Review of systems negative no change in her history  IUP at 32 weeks  Previous  section x1 for repeat.   Plan patient to follow-up in 2 weeks and the plan is to do her repeat section at 39 weeks on

## 2020-11-12 ENCOUNTER — ROUTINE PRENATAL (OUTPATIENT)
Dept: OBGYN CLINIC | Age: 25
End: 2020-11-12
Payer: COMMERCIAL

## 2020-11-12 VITALS
WEIGHT: 186 LBS | SYSTOLIC BLOOD PRESSURE: 116 MMHG | DIASTOLIC BLOOD PRESSURE: 54 MMHG | BODY MASS INDEX: 30.95 KG/M2 | HEART RATE: 103 BPM

## 2020-11-12 PROCEDURE — G8427 DOCREV CUR MEDS BY ELIG CLIN: HCPCS | Performed by: OBSTETRICS & GYNECOLOGY

## 2020-11-12 PROCEDURE — 4004F PT TOBACCO SCREEN RCVD TLK: CPT | Performed by: OBSTETRICS & GYNECOLOGY

## 2020-11-12 PROCEDURE — G8484 FLU IMMUNIZE NO ADMIN: HCPCS | Performed by: OBSTETRICS & GYNECOLOGY

## 2020-11-12 PROCEDURE — G8419 CALC BMI OUT NRM PARAM NOF/U: HCPCS | Performed by: OBSTETRICS & GYNECOLOGY

## 2020-11-12 PROCEDURE — 99213 OFFICE O/P EST LOW 20 MIN: CPT | Performed by: OBSTETRICS & GYNECOLOGY

## 2020-11-12 NOTE — PROGRESS NOTES
Patient here for prenatal visit at 29 weeks and 3 days. She has no complaints other than some left lumbar back discomfort. I gave her some stretching exercises to use. Positive fetal movement kick counts twice daily are good fetal heart rate is 155. Vital signs are normal.  No loss of fluid vaginal bleeding or uterine activity. Review of systems is negative no change in her history. IUP 34 weeks  Previous  section for repeat.   Patient to follow-up in 1 week

## 2020-11-19 ENCOUNTER — ROUTINE PRENATAL (OUTPATIENT)
Dept: OBGYN CLINIC | Age: 25
End: 2020-11-19
Payer: COMMERCIAL

## 2020-11-19 VITALS
BODY MASS INDEX: 30.79 KG/M2 | WEIGHT: 185 LBS | DIASTOLIC BLOOD PRESSURE: 62 MMHG | HEART RATE: 106 BPM | SYSTOLIC BLOOD PRESSURE: 114 MMHG

## 2020-11-19 PROCEDURE — G8427 DOCREV CUR MEDS BY ELIG CLIN: HCPCS | Performed by: OBSTETRICS & GYNECOLOGY

## 2020-11-19 PROCEDURE — 99213 OFFICE O/P EST LOW 20 MIN: CPT | Performed by: OBSTETRICS & GYNECOLOGY

## 2020-11-19 PROCEDURE — 4004F PT TOBACCO SCREEN RCVD TLK: CPT | Performed by: OBSTETRICS & GYNECOLOGY

## 2020-11-19 PROCEDURE — G8419 CALC BMI OUT NRM PARAM NOF/U: HCPCS | Performed by: OBSTETRICS & GYNECOLOGY

## 2020-11-19 PROCEDURE — G8484 FLU IMMUNIZE NO ADMIN: HCPCS | Performed by: OBSTETRICS & GYNECOLOGY

## 2020-11-19 NOTE — PROGRESS NOTES
Patient here for prenatal visit at 27 weeks. Previous  section repeat. 27year-old  1 para 1. Positive fetal movement. No loss of fluid vaginal bleeding or uterine activity  Kick counts twice daily are good. Baseline heart rate is 160. ROS systems is negative no change in her history  IUP at 35 weeks  Previous  section for repeat on .   Placed patient to follow-up in 1 week

## 2020-12-04 ENCOUNTER — ROUTINE PRENATAL (OUTPATIENT)
Dept: OBGYN CLINIC | Age: 25
End: 2020-12-04
Payer: COMMERCIAL

## 2020-12-04 VITALS
DIASTOLIC BLOOD PRESSURE: 50 MMHG | SYSTOLIC BLOOD PRESSURE: 114 MMHG | WEIGHT: 190 LBS | BODY MASS INDEX: 31.62 KG/M2 | HEART RATE: 110 BPM

## 2020-12-04 DIAGNOSIS — Z34.93 PRENATAL CARE, THIRD TRIMESTER: ICD-10-CM

## 2020-12-04 PROCEDURE — G8484 FLU IMMUNIZE NO ADMIN: HCPCS | Performed by: OBSTETRICS & GYNECOLOGY

## 2020-12-04 PROCEDURE — 4004F PT TOBACCO SCREEN RCVD TLK: CPT | Performed by: OBSTETRICS & GYNECOLOGY

## 2020-12-04 PROCEDURE — 99213 OFFICE O/P EST LOW 20 MIN: CPT | Performed by: OBSTETRICS & GYNECOLOGY

## 2020-12-04 PROCEDURE — G8428 CUR MEDS NOT DOCUMENT: HCPCS | Performed by: OBSTETRICS & GYNECOLOGY

## 2020-12-04 PROCEDURE — G8419 CALC BMI OUT NRM PARAM NOF/U: HCPCS | Performed by: OBSTETRICS & GYNECOLOGY

## 2020-12-04 NOTE — PROGRESS NOTES
Patient here for prenatal visit at 42 weeks and 4 days. She is doing well. She is having a repeat section . Positive fetal movement. Kick counts twice daily are good. Baseline fetal heart rate 145  Vital signs are stable. Review of systems is negative  No change in her history  Cervix is closed and long vertex is at a -1 station. Group B strep culture is done. IUP at 37 weeks and 4 days. Follow-up in 1 week.   Repeat  scheduled for

## 2020-12-07 LAB
GROUP B STREP CULTURE: ABNORMAL
ORGANISM: ABNORMAL

## 2020-12-11 ENCOUNTER — NURSE ONLY (OUTPATIENT)
Dept: PRIMARY CARE CLINIC | Age: 25
End: 2020-12-11

## 2020-12-11 ENCOUNTER — ROUTINE PRENATAL (OUTPATIENT)
Dept: OBGYN CLINIC | Age: 25
End: 2020-12-11
Payer: COMMERCIAL

## 2020-12-11 VITALS
HEART RATE: 110 BPM | SYSTOLIC BLOOD PRESSURE: 108 MMHG | DIASTOLIC BLOOD PRESSURE: 60 MMHG | WEIGHT: 191 LBS | BODY MASS INDEX: 31.78 KG/M2

## 2020-12-11 PROCEDURE — G8419 CALC BMI OUT NRM PARAM NOF/U: HCPCS | Performed by: OBSTETRICS & GYNECOLOGY

## 2020-12-11 PROCEDURE — G8484 FLU IMMUNIZE NO ADMIN: HCPCS | Performed by: OBSTETRICS & GYNECOLOGY

## 2020-12-11 PROCEDURE — G8427 DOCREV CUR MEDS BY ELIG CLIN: HCPCS | Performed by: OBSTETRICS & GYNECOLOGY

## 2020-12-11 PROCEDURE — 4004F PT TOBACCO SCREEN RCVD TLK: CPT | Performed by: OBSTETRICS & GYNECOLOGY

## 2020-12-11 PROCEDURE — 99213 OFFICE O/P EST LOW 20 MIN: CPT | Performed by: OBSTETRICS & GYNECOLOGY

## 2020-12-11 NOTE — PROGRESS NOTES
Patient is here for a prenatal visit at 45 weeks and 4 days she is having a repeat  on Monday the . At 39 weeks. She is doing well. No complaints no loss of fluid vaginal bleeding or uterine activity. Positive fetal movement. Kick counts twice daily are good baseline heart rate is 135. Review of systems negative no change in her history  IUP 38 weeks and 4 days.   Plan follow-up on Monday for repeat  section

## 2020-12-12 LAB
SARS-COV-2: NOT DETECTED
SOURCE: NORMAL

## 2020-12-13 NOTE — FLOWSHEET NOTE
Pt notified , travel screening completed, given visitation regarding one visitor, and time of arrival [phone] for 1300  Scheduled C/S

## 2020-12-14 ENCOUNTER — ANESTHESIA EVENT (OUTPATIENT)
Dept: LABOR AND DELIVERY | Age: 25
DRG: 540 | End: 2020-12-14
Payer: COMMERCIAL

## 2020-12-14 ENCOUNTER — HOSPITAL ENCOUNTER (INPATIENT)
Age: 25
LOS: 2 days | Discharge: HOME | DRG: 540 | End: 2020-12-16
Attending: OBSTETRICS & GYNECOLOGY | Admitting: OBSTETRICS & GYNECOLOGY
Payer: COMMERCIAL

## 2020-12-14 ENCOUNTER — ANESTHESIA (OUTPATIENT)
Dept: LABOR AND DELIVERY | Age: 25
DRG: 540 | End: 2020-12-14
Payer: COMMERCIAL

## 2020-12-14 VITALS — OXYGEN SATURATION: 99 % | DIASTOLIC BLOOD PRESSURE: 54 MMHG | SYSTOLIC BLOOD PRESSURE: 117 MMHG

## 2020-12-14 PROBLEM — Z78.9 ADMITTED TO LABOR AND DELIVERY: Status: ACTIVE | Noted: 2020-12-14

## 2020-12-14 LAB
ABO/RH: NORMAL
ALBUMIN SERPL-MCNC: 3.6 G/DL (ref 3.5–4.6)
ALP BLD-CCNC: 130 U/L (ref 40–130)
ALT SERPL-CCNC: 11 U/L (ref 0–33)
AMPHETAMINE SCREEN, URINE: NORMAL
ANION GAP SERPL CALCULATED.3IONS-SCNC: 14 MEQ/L (ref 9–15)
ANTIBODY IDENTIFICATION: NORMAL
ANTIBODY IDENTIFICATION: NORMAL
ANTIBODY SCREEN: NORMAL
APTT: 32.5 SEC (ref 24.4–36.8)
AST SERPL-CCNC: 14 U/L (ref 0–35)
BACTERIA: NEGATIVE /HPF
BARBITURATE SCREEN URINE: NORMAL
BASOPHILS ABSOLUTE: 0 K/UL (ref 0–0.2)
BASOPHILS RELATIVE PERCENT: 0.3 %
BENZODIAZEPINE SCREEN, URINE: NORMAL
BILIRUB SERPL-MCNC: 0.3 MG/DL (ref 0.2–0.7)
BILIRUBIN URINE: NEGATIVE
BLOOD, URINE: NEGATIVE
BUN BLDV-MCNC: 4 MG/DL (ref 6–20)
CALCIUM SERPL-MCNC: 9 MG/DL (ref 8.5–9.9)
CANNABINOID SCREEN URINE: NORMAL
CHLORIDE BLD-SCNC: 105 MEQ/L (ref 95–107)
CLARITY: CLEAR
CO2: 18 MEQ/L (ref 20–31)
COCAINE METABOLITE SCREEN URINE: NORMAL
COLOR: YELLOW
CREAT SERPL-MCNC: 0.51 MG/DL (ref 0.5–0.9)
DAT POLYSPECIFIC: NORMAL
EOSINOPHILS ABSOLUTE: 0.1 K/UL (ref 0–0.7)
EOSINOPHILS RELATIVE PERCENT: 2.5 %
EPITHELIAL CELLS, UA: NORMAL /HPF (ref 0–5)
GFR AFRICAN AMERICAN: >60
GFR NON-AFRICAN AMERICAN: >60
GLOBULIN: 2.7 G/DL (ref 2.3–3.5)
GLUCOSE BLD-MCNC: 81 MG/DL (ref 70–99)
GLUCOSE URINE: NEGATIVE MG/DL
HCT VFR BLD CALC: 36.1 % (ref 37–47)
HEMOGLOBIN: 12.1 G/DL (ref 12–16)
HYALINE CASTS: NORMAL /HPF (ref 0–5)
INR BLD: 0.9
KETONES, URINE: NEGATIVE MG/DL
LEUKOCYTE ESTERASE, URINE: ABNORMAL
LYMPHOCYTES ABSOLUTE: 1.6 K/UL (ref 1–4.8)
LYMPHOCYTES RELATIVE PERCENT: 26.9 %
Lab: NORMAL
MCH RBC QN AUTO: 33.9 PG (ref 27–31.3)
MCHC RBC AUTO-ENTMCNC: 33.6 % (ref 33–37)
MCV RBC AUTO: 100.9 FL (ref 82–100)
METHADONE SCREEN, URINE: NORMAL
MONOCYTES ABSOLUTE: 0.4 K/UL (ref 0.2–0.8)
MONOCYTES RELATIVE PERCENT: 6.4 %
NEUTROPHILS ABSOLUTE: 3.8 K/UL (ref 1.4–6.5)
NEUTROPHILS RELATIVE PERCENT: 63.9 %
NITRITE, URINE: NEGATIVE
OPIATE SCREEN URINE: NORMAL
OXYCODONE URINE: NORMAL
PDW BLD-RTO: 13.2 % (ref 11.5–14.5)
PH UA: 7.5 (ref 5–9)
PHENCYCLIDINE SCREEN URINE: NORMAL
PLATELET # BLD: 184 K/UL (ref 130–400)
POTASSIUM SERPL-SCNC: 3.7 MEQ/L (ref 3.4–4.9)
PROPOXYPHENE SCREEN: NORMAL
PROTEIN UA: NEGATIVE MG/DL
PROTHROMBIN TIME: 12.6 SEC (ref 12.3–14.9)
RAPID HIV 1&2: NEGATIVE
RBC # BLD: 3.58 M/UL (ref 4.2–5.4)
RBC UA: NORMAL /HPF (ref 0–5)
SARS-COV-2, NAAT: NOT DETECTED
SODIUM BLD-SCNC: 137 MEQ/L (ref 135–144)
SPECIFIC GRAVITY UA: 1.01 (ref 1–1.03)
TOTAL PROTEIN: 6.3 G/DL (ref 6.3–8)
UROBILINOGEN, URINE: 1 E.U./DL
WBC # BLD: 5.9 K/UL (ref 4.8–10.8)
WBC UA: NORMAL /HPF (ref 0–5)

## 2020-12-14 PROCEDURE — 2709999900 HC NON-CHARGEABLE SUPPLY: Performed by: OBSTETRICS & GYNECOLOGY

## 2020-12-14 PROCEDURE — 1220000000 HC SEMI PRIVATE OB R&B

## 2020-12-14 PROCEDURE — 86901 BLOOD TYPING SEROLOGIC RH(D): CPT

## 2020-12-14 PROCEDURE — 6370000000 HC RX 637 (ALT 250 FOR IP): Performed by: OBSTETRICS & GYNECOLOGY

## 2020-12-14 PROCEDURE — 85730 THROMBOPLASTIN TIME PARTIAL: CPT

## 2020-12-14 PROCEDURE — 36415 COLL VENOUS BLD VENIPUNCTURE: CPT

## 2020-12-14 PROCEDURE — 6360000002 HC RX W HCPCS: Performed by: OBSTETRICS & GYNECOLOGY

## 2020-12-14 PROCEDURE — 3700000000 HC ANESTHESIA ATTENDED CARE: Performed by: OBSTETRICS & GYNECOLOGY

## 2020-12-14 PROCEDURE — 87340 HEPATITIS B SURFACE AG IA: CPT

## 2020-12-14 PROCEDURE — 6360000002 HC RX W HCPCS: Performed by: NURSE ANESTHETIST, CERTIFIED REGISTERED

## 2020-12-14 PROCEDURE — 85461 HEMOGLOBIN FETAL: CPT

## 2020-12-14 PROCEDURE — 80307 DRUG TEST PRSMV CHEM ANLYZR: CPT

## 2020-12-14 PROCEDURE — 80053 COMPREHEN METABOLIC PANEL: CPT

## 2020-12-14 PROCEDURE — 85025 COMPLETE CBC W/AUTO DIFF WBC: CPT

## 2020-12-14 PROCEDURE — 3609079900 HC CESAREAN SECTION: Performed by: OBSTETRICS & GYNECOLOGY

## 2020-12-14 PROCEDURE — 59025 FETAL NON-STRESS TEST: CPT | Performed by: OBSTETRICS & GYNECOLOGY

## 2020-12-14 PROCEDURE — 81001 URINALYSIS AUTO W/SCOPE: CPT

## 2020-12-14 PROCEDURE — 86870 RBC ANTIBODY IDENTIFICATION: CPT

## 2020-12-14 PROCEDURE — 85610 PROTHROMBIN TIME: CPT

## 2020-12-14 PROCEDURE — 3700000001 HC ADD 15 MINUTES (ANESTHESIA): Performed by: OBSTETRICS & GYNECOLOGY

## 2020-12-14 PROCEDURE — 2580000003 HC RX 258: Performed by: OBSTETRICS & GYNECOLOGY

## 2020-12-14 PROCEDURE — 86880 COOMBS TEST DIRECT: CPT

## 2020-12-14 PROCEDURE — U0002 COVID-19 LAB TEST NON-CDC: HCPCS

## 2020-12-14 PROCEDURE — 59515 CESAREAN DELIVERY: CPT | Performed by: OBSTETRICS & GYNECOLOGY

## 2020-12-14 PROCEDURE — 86592 SYPHILIS TEST NON-TREP QUAL: CPT

## 2020-12-14 PROCEDURE — 86850 RBC ANTIBODY SCREEN: CPT

## 2020-12-14 PROCEDURE — 7100000001 HC PACU RECOVERY - ADDTL 15 MIN: Performed by: OBSTETRICS & GYNECOLOGY

## 2020-12-14 PROCEDURE — 86900 BLOOD TYPING SEROLOGIC ABO: CPT

## 2020-12-14 PROCEDURE — 86703 HIV-1/HIV-2 1 RESULT ANTBDY: CPT

## 2020-12-14 PROCEDURE — 59514 CESAREAN DELIVERY ONLY: CPT | Performed by: OBSTETRICS & GYNECOLOGY

## 2020-12-14 PROCEDURE — 2500000003 HC RX 250 WO HCPCS: Performed by: OBSTETRICS & GYNECOLOGY

## 2020-12-14 PROCEDURE — 7100000000 HC PACU RECOVERY - FIRST 15 MIN: Performed by: OBSTETRICS & GYNECOLOGY

## 2020-12-14 PROCEDURE — 86922 COMPATIBILITY TEST ANTIGLOB: CPT

## 2020-12-14 RX ORDER — CEFAZOLIN SODIUM 2 G/50ML
2 SOLUTION INTRAVENOUS ONCE
Status: COMPLETED | OUTPATIENT
Start: 2020-12-14 | End: 2020-12-14

## 2020-12-14 RX ORDER — SODIUM CHLORIDE 0.9 % (FLUSH) 0.9 %
10 SYRINGE (ML) INJECTION PRN
Status: DISCONTINUED | OUTPATIENT
Start: 2020-12-14 | End: 2020-12-14

## 2020-12-14 RX ORDER — SODIUM CHLORIDE 0.9 % (FLUSH) 0.9 %
10 SYRINGE (ML) INJECTION PRN
Status: DISCONTINUED | OUTPATIENT
Start: 2020-12-14 | End: 2020-12-16 | Stop reason: HOSPADM

## 2020-12-14 RX ORDER — MORPHINE SULFATE 1 MG/ML
INJECTION, SOLUTION EPIDURAL; INTRATHECAL; INTRAVENOUS PRN
Status: DISCONTINUED | OUTPATIENT
Start: 2020-12-14 | End: 2020-12-14 | Stop reason: SDUPTHER

## 2020-12-14 RX ORDER — DEXAMETHASONE SODIUM PHOSPHATE 10 MG/ML
INJECTION, SOLUTION INTRAMUSCULAR; INTRAVENOUS PRN
Status: DISCONTINUED | OUTPATIENT
Start: 2020-12-14 | End: 2020-12-14 | Stop reason: SDUPTHER

## 2020-12-14 RX ORDER — OXYCODONE HYDROCHLORIDE AND ACETAMINOPHEN 5; 325 MG/1; MG/1
2 TABLET ORAL EVERY 4 HOURS PRN
Status: DISCONTINUED | OUTPATIENT
Start: 2020-12-14 | End: 2020-12-16 | Stop reason: HOSPADM

## 2020-12-14 RX ORDER — SODIUM CHLORIDE, SODIUM LACTATE, POTASSIUM CHLORIDE, CALCIUM CHLORIDE 600; 310; 30; 20 MG/100ML; MG/100ML; MG/100ML; MG/100ML
INJECTION, SOLUTION INTRAVENOUS CONTINUOUS
Status: DISCONTINUED | OUTPATIENT
Start: 2020-12-14 | End: 2020-12-16 | Stop reason: HOSPADM

## 2020-12-14 RX ORDER — SCOLOPAMINE TRANSDERMAL SYSTEM 1 MG/1
1 PATCH, EXTENDED RELEASE TRANSDERMAL
Status: DISCONTINUED | OUTPATIENT
Start: 2020-12-14 | End: 2020-12-14

## 2020-12-14 RX ORDER — KETOROLAC TROMETHAMINE 30 MG/ML
30 INJECTION, SOLUTION INTRAMUSCULAR; INTRAVENOUS EVERY 6 HOURS
Status: DISCONTINUED | OUTPATIENT
Start: 2020-12-14 | End: 2020-12-16 | Stop reason: HOSPADM

## 2020-12-14 RX ORDER — OXYCODONE HYDROCHLORIDE AND ACETAMINOPHEN 5; 325 MG/1; MG/1
1 TABLET ORAL EVERY 4 HOURS PRN
Status: DISCONTINUED | OUTPATIENT
Start: 2020-12-14 | End: 2020-12-16 | Stop reason: HOSPADM

## 2020-12-14 RX ORDER — SODIUM CHLORIDE 0.9 % (FLUSH) 0.9 %
10 SYRINGE (ML) INJECTION EVERY 12 HOURS SCHEDULED
Status: DISCONTINUED | OUTPATIENT
Start: 2020-12-14 | End: 2020-12-14

## 2020-12-14 RX ORDER — MODIFIED LANOLIN
OINTMENT (GRAM) TOPICAL
Status: DISCONTINUED | OUTPATIENT
Start: 2020-12-14 | End: 2020-12-16 | Stop reason: HOSPADM

## 2020-12-14 RX ORDER — DOCUSATE SODIUM 100 MG/1
100 CAPSULE, LIQUID FILLED ORAL 2 TIMES DAILY
Status: DISCONTINUED | OUTPATIENT
Start: 2020-12-14 | End: 2020-12-16 | Stop reason: HOSPADM

## 2020-12-14 RX ORDER — NALOXONE HYDROCHLORIDE 0.4 MG/ML
0.4 INJECTION, SOLUTION INTRAMUSCULAR; INTRAVENOUS; SUBCUTANEOUS PRN
Status: DISCONTINUED | OUTPATIENT
Start: 2020-12-14 | End: 2020-12-14

## 2020-12-14 RX ORDER — KETOROLAC TROMETHAMINE 30 MG/ML
INJECTION, SOLUTION INTRAMUSCULAR; INTRAVENOUS PRN
Status: DISCONTINUED | OUTPATIENT
Start: 2020-12-14 | End: 2020-12-14 | Stop reason: SDUPTHER

## 2020-12-14 RX ORDER — ONDANSETRON 2 MG/ML
4 INJECTION INTRAMUSCULAR; INTRAVENOUS EVERY 6 HOURS PRN
Status: DISCONTINUED | OUTPATIENT
Start: 2020-12-14 | End: 2020-12-14

## 2020-12-14 RX ORDER — SODIUM CHLORIDE, SODIUM LACTATE, POTASSIUM CHLORIDE, AND CALCIUM CHLORIDE .6; .31; .03; .02 G/100ML; G/100ML; G/100ML; G/100ML
1000 INJECTION, SOLUTION INTRAVENOUS ONCE
Status: COMPLETED | OUTPATIENT
Start: 2020-12-14 | End: 2020-12-14

## 2020-12-14 RX ORDER — BISACODYL 10 MG
10 SUPPOSITORY, RECTAL RECTAL DAILY PRN
Status: DISCONTINUED | OUTPATIENT
Start: 2020-12-14 | End: 2020-12-16 | Stop reason: HOSPADM

## 2020-12-14 RX ORDER — SODIUM CHLORIDE 0.9 % (FLUSH) 0.9 %
10 SYRINGE (ML) INJECTION EVERY 12 HOURS SCHEDULED
Status: DISCONTINUED | OUTPATIENT
Start: 2020-12-14 | End: 2020-12-16 | Stop reason: HOSPADM

## 2020-12-14 RX ORDER — ONDANSETRON 2 MG/ML
INJECTION INTRAMUSCULAR; INTRAVENOUS PRN
Status: DISCONTINUED | OUTPATIENT
Start: 2020-12-14 | End: 2020-12-14 | Stop reason: SDUPTHER

## 2020-12-14 RX ORDER — FENTANYL CITRATE 50 UG/ML
INJECTION, SOLUTION INTRAMUSCULAR; INTRAVENOUS PRN
Status: DISCONTINUED | OUTPATIENT
Start: 2020-12-14 | End: 2020-12-14 | Stop reason: SDUPTHER

## 2020-12-14 RX ORDER — IBUPROFEN 800 MG/1
800 TABLET ORAL EVERY 8 HOURS
Status: DISCONTINUED | OUTPATIENT
Start: 2020-12-14 | End: 2020-12-16 | Stop reason: HOSPADM

## 2020-12-14 RX ORDER — ONDANSETRON 2 MG/ML
4 INJECTION INTRAMUSCULAR; INTRAVENOUS EVERY 6 HOURS PRN
Status: DISCONTINUED | OUTPATIENT
Start: 2020-12-14 | End: 2020-12-16 | Stop reason: HOSPADM

## 2020-12-14 RX ORDER — SODIUM CHLORIDE, SODIUM LACTATE, POTASSIUM CHLORIDE, CALCIUM CHLORIDE 600; 310; 30; 20 MG/100ML; MG/100ML; MG/100ML; MG/100ML
INJECTION, SOLUTION INTRAVENOUS CONTINUOUS
Status: DISCONTINUED | OUTPATIENT
Start: 2020-12-14 | End: 2020-12-14

## 2020-12-14 RX ADMIN — SODIUM CHLORIDE, POTASSIUM CHLORIDE, SODIUM LACTATE AND CALCIUM CHLORIDE: 600; 310; 30; 20 INJECTION, SOLUTION INTRAVENOUS at 14:39

## 2020-12-14 RX ADMIN — CEFAZOLIN SODIUM 2 G: 2 SOLUTION INTRAVENOUS at 14:00

## 2020-12-14 RX ADMIN — SODIUM CHLORIDE, POTASSIUM CHLORIDE, SODIUM LACTATE AND CALCIUM CHLORIDE: 600; 310; 30; 20 INJECTION, SOLUTION INTRAVENOUS at 13:34

## 2020-12-14 RX ADMIN — MORPHINE SULFATE 0.2 MG: 1 INJECTION EPIDURAL; INTRATHECAL; INTRAVENOUS at 14:12

## 2020-12-14 RX ADMIN — SODIUM CHLORIDE, POTASSIUM CHLORIDE, SODIUM LACTATE AND CALCIUM CHLORIDE 1000 ML: 600; 310; 30; 20 INJECTION, SOLUTION INTRAVENOUS at 11:44

## 2020-12-14 RX ADMIN — Medication 500 ML: at 14:34

## 2020-12-14 RX ADMIN — KETOROLAC TROMETHAMINE 30 MG: 30 INJECTION, SOLUTION INTRAMUSCULAR at 19:54

## 2020-12-14 RX ADMIN — DOCUSATE SODIUM 100 MG: 100 CAPSULE ORAL at 19:52

## 2020-12-14 RX ADMIN — FAMOTIDINE 20 MG: 10 INJECTION INTRAVENOUS at 13:34

## 2020-12-14 RX ADMIN — KETOROLAC TROMETHAMINE 30 MG: 30 INJECTION, SOLUTION INTRAMUSCULAR; INTRAVENOUS at 14:35

## 2020-12-14 RX ADMIN — ONDANSETRON 4 MG: 2 INJECTION INTRAMUSCULAR; INTRAVENOUS at 19:53

## 2020-12-14 RX ADMIN — DEXAMETHASONE SODIUM PHOSPHATE 10 MG: 10 INJECTION, SOLUTION INTRAMUSCULAR; INTRAVENOUS at 14:35

## 2020-12-14 RX ADMIN — ONDANSETRON 4 MG: 2 INJECTION INTRAMUSCULAR; INTRAVENOUS at 14:35

## 2020-12-14 RX ADMIN — FENTANYL CITRATE 15 MCG: 50 INJECTION, SOLUTION INTRAMUSCULAR; INTRAVENOUS at 14:12

## 2020-12-14 RX ADMIN — SODIUM CHLORIDE, POTASSIUM CHLORIDE, SODIUM LACTATE AND CALCIUM CHLORIDE: 600; 310; 30; 20 INJECTION, SOLUTION INTRAVENOUS at 23:43

## 2020-12-14 ASSESSMENT — PULMONARY FUNCTION TESTS
PIF_VALUE: 0
PIF_VALUE: 1
PIF_VALUE: 0
PIF_VALUE: 1
PIF_VALUE: 0
PIF_VALUE: 0
PIF_VALUE: 1
PIF_VALUE: 0
PIF_VALUE: 1
PIF_VALUE: 0

## 2020-12-14 ASSESSMENT — PAIN SCALES - GENERAL: PAINLEVEL_OUTOF10: 0

## 2020-12-14 NOTE — H&P
Department of Obstetrics and Gynecology   History & Physical    Pt Name: Clayton Aguilar  MRN: [id number] Kaye #: [de-identified]  YOB: 1995  Estimated Date of Delivery: 20      HPI: The patient is a 23 y.o.  female  who presents for repeat  section    Allergies: Allergies as of 12/10/2020    (No Known Allergies)       Medications:    Current Facility-Administered Medications:     lactated ringers infusion, , Intravenous, Continuous, Lucrecia Keenan, DO    sodium chloride flush 0.9 % injection 10 mL, 10 mL, Intravenous, 2 times per day, Benjamen Mackintosh, DO    sodium chloride flush 0.9 % injection 10 mL, 10 mL, Intravenous, PRN, Benjamen Mackintosh, DO    ceFAZolin (ANCEF) 2 g in dextrose 3 % 50 mL IVPB (duplex), 2 g, Intravenous, Once, Saul Keenan, DO    ondansetron [facility]) injection 4 mg, 4 mg, Intravenous, Q6H PRN, Yvonne Mackintosh, DO    famotidine (PEPCID) injection 20 mg, 20 mg, Intravenous, Once, Saul Keenan, DO    fentaNYL 125 mcg, ropivacaine 0.2% in sodium chloride 0.9% 127.5ml (OB) epidural, 10 mL/hr, Epidural, Continuous, Benjamen Mackintosh, DO    naloxone [facility]) injection 0.4 mg, 0.4 mg, Intravenous, PRN, Benjamen Mackintosh, DO    scopolamine (TRANSDERM-SCOP) transdermal patch 1 patch, 1 patch, Transdermal, Q72H, Benjamen Mackintosh, DO    OB History:     Gyn History: Denies h/o abnormal pap smear, h/o STDs.      Past Medical History:   Past Medical History:   Diagnosis Date    Anemia     Asthma 2018       Past Surgical History:   Past Surgical History:   Procedure Laterality Date     SECTION N/A 2017     SECTION performed by Yue Lovelace MD at [facility] OR    TONSILLECTOMY AND ADENOIDECTOMY         Social History:   Social History     Tobacco Use   Smoking Status Current Every Day Smoker    Packs/day: 0.50    Years: 4.00    Pack years: 2.00    Types: Cigarettes   Smokeless Tobacco Current User Family History: Noncontributory; Denies h/o cancer. ROS:  Negative except as stated in HPI, denies nausea, vomiting, fever, chills, headache or dysuria. PE:  Vitals:    20 1130   BP:    Pulse:    Resp:    Temp:    SpO2: 100%       General: well nourished, well developed, in no acute distress  CV: Normal heart sounds  Resp: breathing unlabored  Abdomen: Nontender, no rebound, no guarding  FH:  Cx:closed    NST - Cat 1: FHR 140s, moderate variability, +accels, -decels    Labs:       Assessment:   23 y.o. Edwar Johnston female with IUP 39 weeks.  Previous  section for repeat    Plan: repeat [address] [facility], DO

## 2020-12-14 NOTE — PLAN OF CARE
Problem: Discharge Planning:  Goal: Discharged to appropriate level of care  Description: Discharged to appropriate level of care  Outcome: Ongoing     Problem: Fluid Volume - Imbalance:  Goal: Absence of postpartum hemorrhage signs and symptoms  Description: Absence of postpartum hemorrhage signs and symptoms  Outcome: Ongoing  Goal: Absence of imbalanced fluid volume signs and symptoms  Description: Absence of imbalanced fluid volume signs and symptoms  Outcome: Ongoing     Problem: Infection - Surgical Site:  Goal: Will show no infection signs and symptoms  Description: Will show no infection signs and symptoms  Outcome: Ongoing     Problem: Pain - Acute:  Goal: Pain level will decrease  Description: Pain level will decrease  Outcome: Ongoing     Problem: Urinary Retention:  Goal: Urinary elimination within specified parameters  Description: Urinary elimination within specified parameters  Outcome: Ongoing     Problem: Venous Thromboembolism:  Goal: Will show no signs or symptoms of venous thromboembolism  Description: Will show no signs or symptoms of venous thromboembolism  Outcome: Ongoing  Goal: Absence of signs or symptoms of impaired coagulation  Description: Absence of signs or symptoms of impaired coagulation  Outcome: Ongoing

## 2020-12-14 NOTE — ANESTHESIA PROCEDURE NOTES
Spinal Block    Patient location during procedure: OB  Start time: 12/14/2020 2:12 PM  End time: 12/14/2020 2:15 PM  Reason for block: primary anesthetic  Staffing  Resident/CRNA: SRINATH Larson CRNA  Preanesthetic Checklist  Completed: patient identified, IV checked, site marked, risks and benefits discussed, surgical consent, monitors and equipment checked, pre-op evaluation, timeout performed, anesthesia consent given, oxygen available and patient being monitored  Spinal Block  Patient position: sitting  Prep: ChloraPrep  Patient monitoring: continuous pulse ox and frequent blood pressure checks  Approach: midline  Location: L4/L5  Provider prep: mask and sterile gloves  Local infiltration: lidocaine  Dose: 0.2  Agent: bupivacaine (Isobaric bupivicain 0.5%)  Adjuvant: duramorph (fentanyl 15 mcg)  Dose: 2  Dose: 2  Needle  Needle type: Pencan   Needle gauge: 24 G  Needle length: 3.5 in  Assessment  Sensory level: T6  Events: cerebrospinal fluid  Swirl obtained: Yes (CSF aspirated)  CSF: clear  Attempts: 1  Hemodynamics: stable  Additional Notes  Free flowing CSF. Negative heme. Negative paresthesia. Clenton Reas

## 2020-12-14 NOTE — ANESTHESIA PRE PROCEDURE
Department of Anesthesiology  Preprocedure Note       Name:  Jay Jay Wu   Age:  23 y.o.  :  1995                                          MRN:  [id number]         Date:  2020      Surgeon: Cara Maloney):  Diamond Moon DO    Procedure: Procedure(s):   SECTION    Medications prior to admission:   Prior to Admission medications    Medication Sig Start Date End Date Taking? Authorizing Provider   ferrous sulfate (IRON 325) 325 (65 Fe) MG tablet Take 1 tablet by mouth daily (with breakfast) 10/2/20  Yes Diamond Moon, DO   Prenatal Vit-Fe Fumarate-FA (PRENATAL VITAMIN) 27-0.8 MG TABS Take 1 tablet by mouth daily Please dispense what patient's insurance will cover.  20  Yes Kale Keenan, DO   omeprazole (PRILOSEC) 20 MG delayed release capsule Take 1 capsule by mouth every morning (before breakfast) 20   Diamond Moon, DO   famotidine (PEPCID) 20 MG tablet Take 1 tablet by mouth 2 times daily 20   Diamond Moon, DO   ondansetron (ZOFRAN-ODT) 8 MG TBDP disintegrating tablet Place 1 tablet under the tongue every 8 hours as needed for Nausea or Vomiting 20   Diamond Moon, DO       Current medications:    Current Facility-Administered Medications   Medication Dose Route Frequency Provider Last Rate Last Admin    lactated ringers infusion   Intravenous Continuous Diamond Moon, DO        sodium chloride flush 0.9 % injection 10 mL  10 mL Intravenous 2 times per day Diamond Moon, DO        sodium chloride flush 0.9 % injection 10 mL  10 mL Intravenous PRN Diamond Moon, DO        ceFAZolin (ANCEF) 2 g in dextrose 3 % 50 mL IVPB (duplex)  2 g Intravenous Once Diamond Moon, DO        ondansetron [facility]) injection 4 mg  4 mg Intravenous Q6H PRN Diamond Moon, DO        famotidine (PEPCID) injection 20 mg  20 mg Intravenous Once Saul Keenan, DO        fentaNYL 125 mcg, ropivacaine 0.2% in sodium chloride 0.9% 127.5ml (OB) epidural  10 mL/hr Epidural Continuous Jessica Khan DO        naloxone [facility]) injection 0.4 mg  0.4 mg Intravenous PRN Jessica Khan DO        scopolamine (TRANSDERM-SCOP) transdermal patch 1 patch  1 patch Transdermal Q72H Jessica Khan DO           Allergies:  No Known Allergies    Problem List:    Patient Active Problem List   Diagnosis Code    False labor O47.9    Trichomonal vaginitis A59.01    37 weeks gestation of pregnancy Z3A.37    Personal history of other infectious and parasitic diseases Z86.19    Pelvic and perineal pain R10.2    Cigarette nicotine dependence without complication K39.602    Other specified noninflammatory disorders of vagina N89.8    Asthma J45.909    Abdominal pain during pregnancy in second trimester O26.892, R10.9    Admitted to labor and delivery Z78.9       Past Medical History:        Diagnosis Date    Anemia     Asthma 2018       Past Surgical History:        Procedure Laterality Date     SECTION N/A 2017     SECTION performed by Robyn Suarez MD at [facility] L&D OR    TONSILLECTOMY AND ADENOIDECTOMY         Social History:    Social History     Tobacco Use    Smoking status: Current Every Day Smoker     Packs/day: 0.50     Years: 4.00     Pack years: 2.00     Types: Cigarettes    Smokeless tobacco: Current User   Substance Use Topics    Alcohol use:  No                                Ready to quit: Not Answered  Counseling given: Not Answered      Vital Signs (Current):   Vitals:    20 1105 20 1114 20 1130   BP:  137/83    Pulse:  100    Resp:  18    Temp: 37 °C (98.6 °F)     TempSrc: Oral     SpO2:   100%                                              BP Readings from Last 3 Encounters:   20 137/83   20 108/60   20 (!) 114/50       NPO Status:                                                                                 BMI:   Wt Readings from Last 3 Encounters:   20 191 lb (86.6 kg)   20 Anesthesia Plan      spinal     ASA 2             Anesthetic plan and risks discussed with patient. Plan discussed with CRNA.                   SRINATH Henderson - CRNA   12/14/2020

## 2020-12-14 NOTE — ANESTHESIA POSTPROCEDURE EVALUATION
Department of Anesthesiology  Postprocedure Note    Patient: Chris Schwartz  MRN: [id number]  [place]: 1995  Date of evaluation: 2020  Time:  3:08 PM     Procedure Summary     Date: 20 Room / Location: [facility] [place] [address] [place] [address]    Anesthesia Start: 1400 Anesthesia Stop:     Procedure:  SECTION (N/A ) Diagnosis:     Surgeons: Miri Taylor DO Responsible Provider: SRINATH Azevedo CRNA    Anesthesia Type: spinal ASA Status: 2          Anesthesia Type: spinal    Callie Phase I:      Callie Phase II:      Last vitals: Reviewed and per EMR flowsheets.        Anesthesia Post Evaluation    Patient location during evaluation: PACU  Patient participation: complete - patient participated  Level of consciousness: awake and alert  Pain score: 0  Airway patency: patent  Nausea & Vomiting: no nausea and no vomiting  Complications: no  Cardiovascular status: blood pressure returned to baseline and hemodynamically stable  Respiratory status: acceptable  Hydration status: euvolemic

## 2020-12-14 NOTE — BRIEF OP NOTE
Brief Postoperative Note      Patient: Jay Jay Wu  YOB: 1995  MRN: [id number]    Date of Procedure: 2020    Pre-Op Diagnosis: IUP 39 weeks. Previous  section for repeat    Post-Op Diagnosis: Same with a viable female       Procedure(s):   SECTION    Surgeon(s):  DO Joe Augustine MD    Assistant:  * No surgical staff found *    Anesthesia: Spinal    Estimated Blood Loss (mL): 990    Complications: None    Specimens:   * No specimens in log *    Implants:  * No implants in log *      Drains:   Urethral Catheter Non-latex 16 fr (Active)       Findings: viable female. Apgar 8/9. Time 1433.  Weight 2478 grams 5#7 oz    Electronically signed by Diamond Moon DO on 2020 at 3:03 PM

## 2020-12-14 NOTE — ANESTHESIA POSTPROCEDURE EVALUATION
Department of Anesthesiology  Postprocedure Note    Patient: Jaden Garza  MRN: [id number]  [place]: 1995  Date of evaluation: 2020  Time:  3:06 PM     Procedure Summary     Date: 20 Room / Location: [facility] L&D OR [address] [place] [address]    Anesthesia Start: 1400 Anesthesia Stop:     Procedure:  SECTION (N/A ) Diagnosis:     Surgeons: Brian Teague DO Responsible Provider: SRINATH Niño CRNA    Anesthesia Type: spinal ASA Status: 2          Anesthesia Type: spinal    Callie Phase I:      Callie Phase II:      Last vitals: Reviewed and per EMR flowsheets.        Anesthesia Post Evaluation    Patient location during evaluation: PACU  Patient participation: complete - patient participated  Level of consciousness: awake and alert  Pain score: 0  Airway patency: patent  Nausea & Vomiting: no nausea and no vomiting  Complications: no  Cardiovascular status: blood pressure returned to baseline and hemodynamically stable  Respiratory status: acceptable  Hydration status: euvolemic

## 2020-12-14 NOTE — PROGRESS NOTES
Pt here and taken to rm 324. Oriented to room and instructed to provide urine sample and get into gown.

## 2020-12-15 LAB
FETAL SCREEN: NORMAL
HEMOGLOBIN: 9.8 G/DL (ref 12–16)
RPR: NORMAL

## 2020-12-15 PROCEDURE — 36415 COLL VENOUS BLD VENIPUNCTURE: CPT

## 2020-12-15 PROCEDURE — 99024 POSTOP FOLLOW-UP VISIT: CPT | Performed by: OBSTETRICS & GYNECOLOGY

## 2020-12-15 PROCEDURE — 6370000000 HC RX 637 (ALT 250 FOR IP): Performed by: OBSTETRICS & GYNECOLOGY

## 2020-12-15 PROCEDURE — 1220000000 HC SEMI PRIVATE OB R&B

## 2020-12-15 PROCEDURE — 85018 HEMOGLOBIN: CPT

## 2020-12-15 PROCEDURE — 6360000002 HC RX W HCPCS: Performed by: OBSTETRICS & GYNECOLOGY

## 2020-12-15 RX ADMIN — KETOROLAC TROMETHAMINE 30 MG: 30 INJECTION, SOLUTION INTRAMUSCULAR at 02:33

## 2020-12-15 RX ADMIN — KETOROLAC TROMETHAMINE 30 MG: 30 INJECTION, SOLUTION INTRAMUSCULAR at 15:33

## 2020-12-15 RX ADMIN — HUMAN RHO(D) IMMUNE GLOBULIN 300 MCG: 1500 SOLUTION INTRAMUSCULAR; INTRAVENOUS at 02:24

## 2020-12-15 RX ADMIN — DOCUSATE SODIUM 100 MG: 100 CAPSULE ORAL at 08:38

## 2020-12-15 RX ADMIN — KETOROLAC TROMETHAMINE 30 MG: 30 INJECTION, SOLUTION INTRAMUSCULAR at 08:37

## 2020-12-15 RX ADMIN — OXYCODONE AND ACETAMINOPHEN 1 TABLET: 5; 325 TABLET ORAL at 01:16

## 2020-12-15 RX ADMIN — OXYCODONE AND ACETAMINOPHEN 2 TABLET: 5; 325 TABLET ORAL at 05:57

## 2020-12-15 RX ADMIN — IBUPROFEN 800 MG: 800 TABLET ORAL at 21:20

## 2020-12-15 ASSESSMENT — PAIN SCALES - GENERAL
PAINLEVEL_OUTOF10: 7
PAINLEVEL_OUTOF10: 7
PAINLEVEL_OUTOF10: 6
PAINLEVEL_OUTOF10: 5

## 2020-12-15 NOTE — FLOWSHEET NOTE
Due to COVID-19 ACTION PLAN, the patient's office visit was converted to a telephone visit.    This patient verbally consents to a telephone visit and the presence of a scribe.     Patient states they are Reyes Villar and that they are speaking to me from their home.     Subjective:    Referred by: Rocco Phelan DO    Chief Complaint   Patient presents with    Consultation     c/o gas pain, lower abdominal pain, bloating, change in bowel habits , colon: 04/26/2019       HPI:  The patient is a 68 year old year old male who presents for gastroenterology consultation regarding GI upset.     Reyes was seen in the [facility] ER for 2 days in 1/2020 for complaints of gas pains in his chest and LLQ abdomen. He has a history of MI. CAT scan in 1/2020 showed Cholelithiasis. Patient complains of epigastric pain, abnormal bowel sounds, gas, increased urinary frequency and urgency, and coughing up blood-tinged phlegm. The blood in his phlegm has resolved. The epigastric pain can radiate lower into his abdomen. No vomiting. He states he has an enlarged esophagus. His PCP increased his dosage of Prilosec and Rx Trulance. Pt denies acid reflux. He notes Trulance caused him extensive diarrhea, so he stopped within 1 month of taking Trulance. Last time he took Trulance was 1.5 months ago. He notes his BMs minimally improved since stopping Trulance. Reyes has a BM 3-4 times per day in comparison to previously having a BM twice a day. Pt urinates 7-8 times per day. The pt has not been seen by a Urologist. Last colonoscopy was in 2019 at [facility] that revealed Diverticulosis. Pt notes he had an EGD 20 years ago. He eats vegetables on occasion.     PMHx. HTN, DM2, Asthma, COPD    SocialHx. Smoked cigarettes in the past. Quit smoking in 1995.     ROS:  A complete 14-point review of systems negative except as noted in the HPI above.    Objective  Current Outpatient Medications   Medication Sig Dispense Refill    hydrALAZINE (APRESOLINE)  Patient up to bathroom at this time via sera steady and x1 staff assist. Dodd catheter removed at this time. Niya care performed. Voided unnmeasurable amount. 50 MG tablet TAKE 1 TABLET BY MOUTH EVERY 8 HOURS 90 tablet 0    carvedilol (COREG) 25 MG tablet Take 1 tablet by mouth 2 times daily (with meals). 180 tablet 2    lansoprazole (PREVACID SOLUTAB) 30 MG disintegrating tablet Take 1 tablet by mouth daily. 30 tablet 3    valsartan (DIOVAN) 160 MG tablet TAKE 2 TABLETS BY MOUTH EVERY  tablet 0    metFORMIN (GLUCOPHAGE-XR) 500 MG 24 hr tablet TAKE 2 TABLETS BY MOUTH TWICE A  tablet 0    fluticasone-umeclidin-vilanterol (TRELEGY ELLIPTA) 100-62.5-25 MCG/INH inhaler Inhale 1 puff into the lungs daily. 90 each 3    amLODIPine (NORVASC) 5 MG tablet Take 1 tablet by mouth daily. 90 tablet 3    torsemide (DEMADEX) 20 MG tablet Take 0.5 tablets by mouth daily. 30 tablet 3    potassium chloride (K-TABS) 10 MEQ ER tablet Take 1 tablet by mouth daily. 90 tablet 3    mometasone (NASONEX) 50 MCG/ACT nasal spray SPRAY 2 SPRAYS INTO EACH NOSTRIL EVERY DAY 51 Bottle 1    montelukast (SINGULAIR) 10 MG tablet Take 1 tablet by mouth daily. 90 tablet 2    levocetirizine (XYZAL) 5 MG tablet Take 1 tablet by mouth every evening. 90 tablet 2    rosuvastatin (CRESTOR) 20 MG tablet Take 1 tablet by mouth daily. 90 tablet 3    empagliflozin (JARDIANCE) 25 MG tablet Take 1 tablet by mouth daily (before breakfast). 90 tablet 3    Azelastine HCl 137 MCG/SPRAY Solution SPRAY 2 SPRAYS INTO EACH NOSTRIL TWICE A DAY 1 Bottle 3    aspirin 81 MG tablet Take 81 mg by mouth daily.      nitroGLYcerin (NITROSTAT) 0.4 MG sublingual tablet DISSOLVE 1 TABLET UNDER THE TONGUE AS NEEDED FOR CHEST PAIN      metoCLOPramide (REGLAN) 5 MG tablet Take 1 tablet by mouth 3 times daily (before meals). 90 tablet 3     No current facility-administered medications for this visit.        Past Medical History:   Diagnosis Date    Coronary artery disease     Dyslipidemia     HHD (hypertensive heart disease)     Statin intolerance        Past Surgical History:   Procedure Laterality Date    Colonoscopy       04/26/2019    Coronary angioplasty  04/03/2017       Social History     Tobacco Use    Smoking status: Former Smoker    Smokeless tobacco: Never Used   Substance Use Topics    Alcohol use: Yes     Alcohol/week: 2.0 - 4.0 standard drinks     Types: 1 - 2 Standard drinks or equivalent, 1 - 2 Cans of beer per week     Frequency: Never     Comment: daily    Drug use: No       Visit Vitals  Ht 5' 11\" (1.803 m)   Wt 118.4 kg (261 lb)   BMI 36.40 kg/m²     Labs/Imaging    CT ABDOMEN AND PELVIS WO CON (1/24/2020)  IMPRESSION:  1. Liver steatosis.  2. Cholelithiasis.  3.  No evidence of hydronephrosis or nephrolithiasis.  4.  Diverticulosis.  5.  L4-5 complete disc desiccation and vacuum phenomenon. There is a broadbase disc protrusion, facet and ligamentum hypertrophy changes causing multifactorial severe central canal stenosis and compression of the cauda equina.    Colonoscopy (4/26/2019)  Providers: Yasemin Osborne MD  Findings:       Many medium-mouthed diverticula were found in the left colon.    Impression:       - Diverticulosis in the left colon.       - No specimens collected.    Colonoscopy (4/04/2016)  SURGEON: Dr. Barbie Metcalf.  Biopsies were normal without any active inflammation.   A repeat exam is recommended in 3 years for routine surveillance of Ulcerative colitis.     Lab Results   Component Value Date    WBC 6.9 01/25/2020    RBC 4.56 01/25/2020    HGB 14.3 01/25/2020    HCT 43.4 01/25/2020    MCV 95.2 01/25/2020    MCH 31.4 01/25/2020    MCHC 32.9 01/25/2020     01/25/2020    TLYMPH 22 01/25/2020    PMON 14 01/25/2020    PEOS 3 01/25/2020    PBASO 0 01/25/2020    ANEUT 4.1 01/25/2020    ALYMS 1.6 01/25/2020    JOSE RAFAEL 1.0 (H) 01/25/2020    AEOS 0.2 01/25/2020    ABASO 0.0 01/25/2020    NEUT NOT APPLICABLE 01/25/2020    TDIF AUTOMATED DIFFERENTIAL 01/25/2020    IANC NOT APPLICABLE 01/25/2020    NRBCRE 0 01/25/2020    PIMGR 0 01/25/2020    AIMGR 0.0 01/25/2020       Lab Results   Component Value  Date    SODIUM 140 01/25/2020    POTASSIUM 3.6 01/25/2020    CHLORIDE 102 01/25/2020    CO2 30 01/25/2020    BUN 21 (H) 01/25/2020    CREATININE 0.93 01/25/2020    CALCIUM 9.2 01/25/2020    ALBUMIN 3.5 (L) 01/25/2020    BILIRUBIN 0.5 01/25/2020    ALKPT 57 01/25/2020    GPT 48 01/25/2020    AST 28 01/25/2020    GLUCOSE 162 (H) 01/25/2020       Lab Results   Component Value Date    CHOLESTEROL 135 06/20/2019    CHOLESTEROL 97 01/23/2018    CHOLESTEROL 104 08/11/2017    TRIGLYCERIDE 130 06/20/2019    TRIGLYCERIDE 112 01/23/2018    TRIGLYCERIDE 112 08/11/2017    HDL 46 06/20/2019    HDL 39 (L) 01/23/2018    HDL 45 08/11/2017    CALCLDL 63 06/20/2019    CALCLDL 36 01/23/2018    CALCLDL 37 08/11/2017       Lab Results   Component Value Date    ALKPT 57 01/25/2020    BILIRUBIN 0.5 01/25/2020    DBIL 0.1 01/24/2020    ALBUMIN 3.5 (L) 01/25/2020    GPT 48 01/25/2020    AST 28 01/25/2020       Lab Results   Component Value Date    HGBA1C 7.4 (H) 06/20/2019    HGBA1C 6.6 (H) 02/10/2017    HGBA1C 5.6 09/14/2016       Lab Results   Component Value Date    SEPT 1 to 5 07/23/2019    ERYT 1 to 2 07/23/2019    LEUK 1 to 5 07/23/2019    UBACTR NONE SEEN 07/23/2019    HCAST NONE SEEN 07/23/2019       Lab Results   Component Value Date    TSH 1.15 09/14/2016       Lab Results   Component Value Date    VB12 464 09/22/2014       Lab Results   Component Value Date    COL YELLOW 01/24/2020    UAPP CLEAR 01/24/2020    USPG 1.020 01/24/2020    UPH 5.5 01/24/2020    UPROT TRACE (A) 01/24/2020    UGLU >1000 (A) 01/24/2020    UKET NEGATIVE 01/24/2020    UROB 0.2 01/24/2020    UBILI NEGATIVE 01/24/2020    UNITR NEGATIVE 01/24/2020    SEPT 1 to 5 07/23/2019    ERYT 1 to 2 07/23/2019    LEUK 1 to 5 07/23/2019    UBACTR NONE SEEN 07/23/2019    HCAST NONE SEEN 07/23/2019    SPTYU URINE, CLEAN CATCH/MIDSTREAM 01/24/2020       ASSESSMENT:     Reyes is a 68 year old male with:    Abdominal Pain secondary to Gall Stones  History of  IBS  GERD      Plan    HIDA scan ordered.   Recommended to continue Prilosec 30 MG daily.   Recommended a high fiber diet. Eat 1 apple or pear with every meal.   Use Metamucil or OTC fiber supplement daily and monitor symptoms.     Follow up after completion of HIDA scan.     The plan which includes procedure(s) and/or treatment was discussed with the patient. Risks, benefits, alternatives, and all questions/concerns were answered. Patient expressed their understanding and is in agreement with the plan.    I spent a total of 27 minutes with the patient. All of the patient's questions were answered.  Clinician Location: Home  Patient Location: Home    On 6/29/2020, Darshan ZHANG scribed the services personally performed by Yasemin Osborne MD.    The scribe's documentation has been prepared under my direction and personally reviewed by me in its entirety. I confirm that the note above accurately reflects all work, treatment, procedures, and medical decision making performed by me.    The documentation recorded by the scribe accurately and completely reflects the service(s) I personally performed and the decisions made by me.

## 2020-12-15 NOTE — PROGRESS NOTES
C Section Postpartum Progress Note    Subjective:      23 y.o.  [id number] @ 39w0d    Postpartum Day 1:  Delivery for repeat    The patient feels well. The patient denies emotional concerns. Pain is well controlled with current medications. The baby iswell. Baby is feeding via breast. Urinary output is adequate. The patient is ambulating well. The patient is tolerating a normal diet. Flatus denies been passed. Objective:      Patient Vitals for the past 8 hrs:   BP Temp Temp src Pulse Resp SpO2   12/15/20 0843 (!) 119/46 98.5 °F (36.9 °C) Tympanic 64 16 99 %   12/15/20 0640 (!) 122/45 98.3 °F (36.8 °C) Oral 61 18 97 %   12/15/20 0237 135/61 98.4 °F (36.9 °C) Oral 60 18 100 %   12/15/20 0111      98 %     General:    alert, appears stated age and cooperative   Bowel Sounds:  active   Lochia:  appropriate   Uterine Fundus:   firm   Incision:  healing well, no significant drainage, no dehiscence, no significant erythema   DVT Evaluation:  No evidence of DVT seen on physical exam.         Assessment:     Status post  section. Doing well postoperatively. Plan: 1. Routine post op care    Continue current care.     Catrachita Colon

## 2020-12-15 NOTE — PLAN OF CARE
Problem: Discharge Planning:  Goal: Discharged to appropriate level of care  Description: Discharged to appropriate level of care  12/15/2020 0051 by Billy Ramírez RN  Outcome: Ongoing  12/14/2020 1658 by Antonia Fuentes RN  Outcome: Ongoing     Problem: Fluid Volume - Imbalance:  Goal: Absence of postpartum hemorrhage signs and symptoms  Description: Absence of postpartum hemorrhage signs and symptoms  12/15/2020 0051 by Billy Ramírez RN  Outcome: Ongoing  12/14/2020 1658 by Antonia Fuentes RN  Outcome: Ongoing  Goal: Absence of imbalanced fluid volume signs and symptoms  Description: Absence of imbalanced fluid volume signs and symptoms  12/15/2020 0051 by Billy Ramírez RN  Outcome: Ongoing  12/14/2020 1658 by Antonia Fuentes RN  Outcome: Ongoing     Problem: Infection - Surgical Site:  Goal: Will show no infection signs and symptoms  Description: Will show no infection signs and symptoms  12/15/2020 0051 by Billy Ramírez RN  Outcome: Ongoing  12/14/2020 1658 by Antonia Fuentes RN  Outcome: Ongoing     Problem: Pain - Acute:  Goal: Pain level will decrease  Description: Pain level will decrease  12/15/2020 0051 by Billy Ramírez RN  Outcome: Ongoing  12/14/2020 1658 by Antonia Fuentes RN  Outcome: Ongoing     Problem: Urinary Retention:  Goal: Urinary elimination within specified parameters  Description: Urinary elimination within specified parameters  12/15/2020 0051 by Billy Ramírez RN  Outcome: Ongoing  12/14/2020 1658 by Antonia Fuentes RN  Outcome: Ongoing     Problem: Venous Thromboembolism:  Goal: Will show no signs or symptoms of venous thromboembolism  Description: Will show no signs or symptoms of venous thromboembolism  12/15/2020 0051 by Billy Ramírez RN  Outcome: Ongoing  12/14/2020 1658 by Antonia Fuentes RN  Outcome: Ongoing  Goal: Absence of signs or symptoms of impaired coagulation  Description: Absence of signs or symptoms of impaired coagulation  12/15/2020 0051 by Billy Ramírez RN  Outcome: Ongoing 12/14/2020 1658 by Amee Gaytan RN  Outcome: Ongoing

## 2020-12-15 NOTE — OP NOTE
entered bluntly and retracted laterally. A great deal of scarring on  the peritoneum and vesicouterine reflection was exposed and then incised  and  from lower uterine segment which was scored bluntly and  then the intrauterine cavity was entered bluntly. The myometrial  incision was extended bilaterally. Clear fluid was encountered after  the membranes were ruptured. The baby's head was then delivered from  the low-segment incision. The oronasopharynx was suctioned free of  clear secretions. The remainder of the baby was delivered and was  placed on the operating room table. After a brief delay, the cord was clamped and cut. The baby was taken  to the warmer and evaluated by nursing. The Apgars were 8 and 9. Birth  weight was 5 pounds 7 ounces or 2478 gm. Again, it was a grossly normal  female and she was born at 14:33. Cord bloods were obtained. The  placenta was then manually removed from the intrauterine cavity which  was then wiped free of any retained placental tissue. She received  Pitocin and adequate contraction of the uterus occurred. The ovaries  and fallopian tubes were palpated. The myometrium was then closed using  a single layer of running locking closure of 0 Vicryl suture. One  figure-of-eight stitch was necessary to achieve hemostasis. The pelvis  was then copiously irrigated, cleansed of any debris and blood clot. Hemostasis present on the incision line. The Surgicel powder was then  sprayed across this area. The peritoneum was closed. Fascia was closed  with number one PDS, subcutaneous fat was closed and then the skin edges  were reapproximated with Insorb staples. The incision was then cleansed  and appropriately dressed. Uterus was Crede'd free of clots and  irrigating solution. The patient was taken to the recovery room in good  condition. She tolerated the surgery and the anesthesia well.   Blood loss 500 mL. Sponge, needle and instrument counts were correct.         Minh Carroll DO    D: 12/14/2020 15:08:02       T: 12/14/2020 15:11:45     JOSE_MARY [email or web address]  Job#: [id number]     Doc#: [id number]    CC:

## 2020-12-16 VITALS
RESPIRATION RATE: 18 BRPM | DIASTOLIC BLOOD PRESSURE: 67 MMHG | SYSTOLIC BLOOD PRESSURE: 132 MMHG | TEMPERATURE: 97.8 F | HEART RATE: 78 BPM | OXYGEN SATURATION: 99 %

## 2020-12-16 LAB
BLOOD BANK DISPENSE STATUS: NORMAL
BLOOD BANK DISPENSE STATUS: NORMAL
BLOOD BANK PRODUCT CODE: NORMAL
BLOOD BANK PRODUCT CODE: NORMAL
BPU ID: NORMAL
BPU ID: NORMAL
DESCRIPTION BLOOD BANK: NORMAL
DESCRIPTION BLOOD BANK: NORMAL
HEPATITIS B SURFACE ANTIGEN INTERPRETATION: NORMAL

## 2020-12-16 PROCEDURE — 99024 POSTOP FOLLOW-UP VISIT: CPT | Performed by: OBSTETRICS & GYNECOLOGY

## 2020-12-16 PROCEDURE — 6370000000 HC RX 637 (ALT 250 FOR IP): Performed by: OBSTETRICS & GYNECOLOGY

## 2020-12-16 RX ORDER — OXYCODONE HYDROCHLORIDE AND ACETAMINOPHEN 5; 325 MG/1; MG/1
1 TABLET ORAL EVERY 6 HOURS PRN
Qty: 20 TABLET | Refills: 0 | Status: SHIPPED | OUTPATIENT
Start: 2020-12-16 | End: 2020-12-21

## 2020-12-16 RX ORDER — IBUPROFEN 600 MG/1
600 TABLET ORAL 4 TIMES DAILY PRN
Qty: 40 TABLET | Refills: 0 | Status: SHIPPED | OUTPATIENT
Start: 2020-12-16 | End: 2021-12-01

## 2020-12-16 RX ORDER — LANOLIN ALCOHOL/MO/W.PET/CERES
325 CREAM (GRAM) TOPICAL 2 TIMES DAILY
Qty: 90 TABLET | Refills: 3 | Status: SHIPPED | OUTPATIENT
Start: 2020-12-16 | End: 2021-12-01 | Stop reason: ALTCHOICE

## 2020-12-16 RX ADMIN — OXYCODONE AND ACETAMINOPHEN 2 TABLET: 5; 325 TABLET ORAL at 03:26

## 2020-12-16 RX ADMIN — DOCUSATE SODIUM 100 MG: 100 CAPSULE ORAL at 09:39

## 2020-12-16 RX ADMIN — IBUPROFEN 800 MG: 800 TABLET ORAL at 06:29

## 2020-12-16 ASSESSMENT — PAIN SCALES - GENERAL
PAINLEVEL_OUTOF10: 10
PAINLEVEL_OUTOF10: 5

## 2020-12-16 NOTE — DISCHARGE SUMMARY
Obstetric Discharge Summary    Reasons for Admission on 2020 10:45 AM   Section (Repeat)    Prenatal Procedures  None    Intrapartum Procedures  Repeat  section    Postpartum Procedures  None     Data  Information for the patient's :  Sara López [[id number]]   female   Birth Weight: 5 lb 7.4 oz (2.478 kg)     Discharge With Mother  Complications: No    Discharge Diagnosis  Patient Active Problem List    Diagnosis Date Noted    Admitted to labor and delivery 2020    Previous  section     39 weeks gestation of pregnancy     Abdominal pain during pregnancy in second trimester     Personal history of other infectious and parasitic diseases 2018    Pelvic and perineal pain 2018    Cigarette nicotine dependence without complication     Other specified noninflammatory disorders of vagina 2018    Asthma 2018    False labor     Trichomonal vaginitis     37 weeks gestation of pregnancy        Discharge Information  Current Discharge Medication List      CONTINUE these medications which have NOT CHANGED    Details   ferrous sulfate (IRON 325) 325 (65 Fe) MG tablet Take 1 tablet by mouth daily (with breakfast)  Qty: 90 tablet, Refills: 1      Prenatal Vit-Fe Fumarate-FA (PRENATAL VITAMIN) 27-0.8 MG TABS Take 1 tablet by mouth daily Please dispense what patient's insurance will cover. Qty: 30 tablet, Refills: 11    Associated Diagnoses: Amenorrhea      omeprazole (PRILOSEC) 20 MG delayed release capsule Take 1 capsule by mouth every morning (before breakfast)  Qty: 30 capsule, Refills: 5      famotidine (PEPCID) 20 MG tablet Take 1 tablet by mouth 2 times daily  Qty: 60 tablet, Refills: 3      ondansetron (ZOFRAN-ODT) 8 MG TBDP disintegrating tablet Place 1 tablet under the tongue every 8 hours as needed for Nausea or Vomiting  Qty: 21 tablet, Refills: 3             No discharge procedures on file.

## 2020-12-16 NOTE — PLAN OF CARE
Problem: Discharge Planning:  Goal: Discharged to appropriate level of care  12/16/2020 0756 by Anh Minor RN  Outcome: Ongoing  12/16/2020 0015 by Ephriam Duane, RN  Outcome: Ongoing     Problem: Fluid Volume - Imbalance:  Goal: Absence of postpartum hemorrhage signs and symptoms  12/16/2020 0756 by Anh Minor RN  Outcome: Ongoing  12/16/2020 0015 by Ephriam Duane, RN  Outcome: Ongoing  Goal: Absence of imbalanced fluid volume signs and symptoms  12/16/2020 0756 by Anh Minor RN  Outcome: Ongoing  12/16/2020 0015 by Ephriam Duane, RN  Outcome: Ongoing     Problem: Infection - Surgical Site:  Goal: Will show no infection signs and symptoms  12/16/2020 0756 by Anh Minor RN  Outcome: Ongoing  12/16/2020 0015 by Ephriam Duane, RN  Outcome: Ongoing     Problem: Pain - Acute:  Goal: Pain level will decrease  12/16/2020 0756 by Anh Minor RN  Outcome: Ongoing  12/16/2020 0015 by Ephriam Duane, RN  Outcome: Ongoing     Problem: Urinary Retention:  Goal: Urinary elimination within specified parameters  12/16/2020 0756 by Anh Minor RN  Outcome: Ongoing  12/16/2020 0015 by Ephriam Duane, RN  Outcome: Ongoing     Problem: Venous Thromboembolism:  Goal: Will show no signs or symptoms of venous thromboembolism  12/16/2020 0756 by Anh Minor RN  Outcome: Ongoing  12/16/2020 0015 by Ephriam Duane, RN  Outcome: Ongoing  Goal: Absence of signs or symptoms of impaired coagulation  12/16/2020 0756 by Anh Minor RN  Outcome: Ongoing  12/16/2020 0015 by Ephriam Duane, RN  Outcome: Ongoing

## 2020-12-29 ENCOUNTER — OFFICE VISIT (OUTPATIENT)
Dept: OBGYN CLINIC | Age: 25
End: 2020-12-29

## 2020-12-29 VITALS
SYSTOLIC BLOOD PRESSURE: 100 MMHG | BODY MASS INDEX: 29.95 KG/M2 | HEART RATE: 120 BPM | WEIGHT: 180 LBS | DIASTOLIC BLOOD PRESSURE: 70 MMHG

## 2020-12-29 PROCEDURE — 1111F DSCHRG MED/CURRENT MED MERGE: CPT | Performed by: OBSTETRICS & GYNECOLOGY

## 2020-12-29 PROCEDURE — G8484 FLU IMMUNIZE NO ADMIN: HCPCS | Performed by: OBSTETRICS & GYNECOLOGY

## 2020-12-29 PROCEDURE — 99213 OFFICE O/P EST LOW 20 MIN: CPT | Performed by: OBSTETRICS & GYNECOLOGY

## 2020-12-29 PROCEDURE — G8427 DOCREV CUR MEDS BY ELIG CLIN: HCPCS | Performed by: OBSTETRICS & GYNECOLOGY

## 2020-12-29 PROCEDURE — G8419 CALC BMI OUT NRM PARAM NOF/U: HCPCS | Performed by: OBSTETRICS & GYNECOLOGY

## 2020-12-29 PROCEDURE — 4004F PT TOBACCO SCREEN RCVD TLK: CPT | Performed by: OBSTETRICS & GYNECOLOGY

## 2020-12-29 ASSESSMENT — ENCOUNTER SYMPTOMS
SORE THROAT: 0
COUGH: 0
COLOR CHANGE: 0
WHEEZING: 0
ABDOMINAL PAIN: 0
TROUBLE SWALLOWING: 0
BACK PAIN: 0
NAUSEA: 0
CHEST TIGHTNESS: 0
SHORTNESS OF BREATH: 0
VOMITING: 0
ABDOMINAL DISTENTION: 0
CONSTIPATION: 0
VOICE CHANGE: 0
BLOOD IN STOOL: 0

## 2020-12-29 NOTE — PROGRESS NOTES
Subjective: Patient is 2 weeks post repeat  section. She is doing well she has no complaints bowel and bladder function are normal.  Her vaginal bleeding is scant. Baby is doing extremely well. No evidence of depression or anxiety      Patient ID: Ken Goss is a 23 y.o. female. HPI    Review of Systems   Constitutional: Negative for activity change, appetite change, fatigue and unexpected weight change. HENT: Negative for dental problem, ear pain, hearing loss, nosebleeds, sore throat, trouble swallowing and voice change. Eyes: Negative for visual disturbance. Respiratory: Negative for cough, chest tightness, shortness of breath and wheezing. Cardiovascular: Negative for chest pain and palpitations. Gastrointestinal: Negative for abdominal distention, abdominal pain, blood in stool, constipation, nausea and vomiting. Endocrine: Negative for cold intolerance, heat intolerance, polydipsia, polyphagia and polyuria. Genitourinary: Negative for difficulty urinating, dyspareunia, dysuria, flank pain, frequency, genital sores, hematuria, menstrual problem, pelvic pain, urgency, vaginal bleeding, vaginal discharge and vaginal pain. Musculoskeletal: Negative for arthralgias, back pain, joint swelling and myalgias. Skin: Negative for color change and rash. Allergic/Immunologic: Negative for environmental allergies, food allergies and immunocompromised state. Neurological: Negative for dizziness, seizures, syncope, speech difficulty, weakness, numbness and headaches. Hematological: Negative for adenopathy. Does not bruise/bleed easily. Psychiatric/Behavioral: Negative for agitation, behavioral problems, confusion, decreased concentration, dysphoric mood and suicidal ideas. The patient is not nervous/anxious and is not hyperactive. Objective: Incision is healing well. Patient is in no distress.    Physical Exam    Assessment:    Postop evaluation following repeat  section      Plan:    Patient to follow-up in 4 weeks. She is to increase her normal activities.         Smiley Guerin, DO

## 2021-02-11 ENCOUNTER — HOSPITAL ENCOUNTER (EMERGENCY)
Age: 26
Discharge: ANOTHER HOSPITAL | End: 2021-02-11
Attending: EMERGENCY MEDICINE
Payer: COMMERCIAL

## 2021-02-11 ENCOUNTER — APPOINTMENT (OUTPATIENT)
Dept: CT IMAGING | Age: 26
End: 2021-02-11
Payer: COMMERCIAL

## 2021-02-11 DIAGNOSIS — N83.201 CYST OF RIGHT OVARY: ICD-10-CM

## 2021-02-11 DIAGNOSIS — R10.30 LOWER ABDOMINAL PAIN, UNSPECIFIED: Primary | ICD-10-CM

## 2021-02-11 LAB
ALBUMIN SERPL-MCNC: 3.9 G/DL (ref 3.5–4.6)
ALP BLD-CCNC: 57 U/L (ref 40–130)
ALT SERPL-CCNC: 17 U/L (ref 0–33)
ANION GAP SERPL CALCULATED.3IONS-SCNC: 11 MEQ/L (ref 9–15)
AST SERPL-CCNC: 19 U/L (ref 0–35)
BACTERIA: ABNORMAL /HPF
BASOPHILS ABSOLUTE: 0 K/UL (ref 0–0.2)
BASOPHILS RELATIVE PERCENT: 0.3 %
BILIRUB SERPL-MCNC: 0.3 MG/DL (ref 0.2–0.7)
BILIRUBIN URINE: NEGATIVE
BLOOD, URINE: ABNORMAL
BUN BLDV-MCNC: 9 MG/DL (ref 6–20)
CALCIUM SERPL-MCNC: 8.7 MG/DL (ref 8.5–9.9)
CHLORIDE BLD-SCNC: 104 MEQ/L (ref 95–107)
CHP ED QC CHECK: NORMAL
CLARITY: CLEAR
CO2: 25 MEQ/L (ref 20–31)
COLOR: YELLOW
CREAT SERPL-MCNC: 0.73 MG/DL (ref 0.5–0.9)
EOSINOPHILS ABSOLUTE: 0.5 K/UL (ref 0–0.7)
EOSINOPHILS RELATIVE PERCENT: 5.9 %
EPITHELIAL CELLS, UA: ABNORMAL /HPF (ref 0–5)
GFR AFRICAN AMERICAN: >60
GFR NON-AFRICAN AMERICAN: >60
GLOBULIN: 2.6 G/DL (ref 2.3–3.5)
GLUCOSE BLD-MCNC: 95 MG/DL (ref 70–99)
GLUCOSE URINE: NEGATIVE MG/DL
HCT VFR BLD CALC: 37.6 % (ref 37–47)
HEMOGLOBIN: 12.6 G/DL (ref 12–16)
HYALINE CASTS: ABNORMAL /HPF (ref 0–5)
KETONES, URINE: ABNORMAL MG/DL
LEUKOCYTE ESTERASE, URINE: ABNORMAL
LIPASE: 26 U/L (ref 12–95)
LYMPHOCYTES ABSOLUTE: 1.9 K/UL (ref 1–4.8)
LYMPHOCYTES RELATIVE PERCENT: 23 %
MCH RBC QN AUTO: 31.9 PG (ref 27–31.3)
MCHC RBC AUTO-ENTMCNC: 33.5 % (ref 33–37)
MCV RBC AUTO: 95.1 FL (ref 82–100)
MONOCYTES ABSOLUTE: 0.3 K/UL (ref 0.2–0.8)
MONOCYTES RELATIVE PERCENT: 3.5 %
NEUTROPHILS ABSOLUTE: 5.7 K/UL (ref 1.4–6.5)
NEUTROPHILS RELATIVE PERCENT: 67.3 %
NITRITE, URINE: NEGATIVE
PDW BLD-RTO: 13.6 % (ref 11.5–14.5)
PH UA: 6 (ref 5–9)
PLATELET # BLD: 240 K/UL (ref 130–400)
POC CREATININE WHOLE BLOOD: 0.8
POTASSIUM SERPL-SCNC: 3.3 MEQ/L (ref 3.4–4.9)
PREGNANCY TEST URINE, POC: NEGATIVE
PROTEIN UA: NEGATIVE MG/DL
RBC # BLD: 3.95 M/UL (ref 4.2–5.4)
RBC UA: ABNORMAL /HPF (ref 0–5)
SODIUM BLD-SCNC: 140 MEQ/L (ref 135–144)
SPECIFIC GRAVITY UA: 1.02 (ref 1–1.03)
TOTAL PROTEIN: 6.5 G/DL (ref 6.3–8)
URINE REFLEX TO CULTURE: ABNORMAL
UROBILINOGEN, URINE: 1 E.U./DL
WBC # BLD: 8.5 K/UL (ref 4.8–10.8)
WBC UA: ABNORMAL /HPF (ref 0–5)

## 2021-02-11 PROCEDURE — 2580000003 HC RX 258: Performed by: EMERGENCY MEDICINE

## 2021-02-11 PROCEDURE — 85025 COMPLETE CBC W/AUTO DIFF WBC: CPT

## 2021-02-11 PROCEDURE — 74177 CT ABD & PELVIS W/CONTRAST: CPT

## 2021-02-11 PROCEDURE — 6360000002 HC RX W HCPCS: Performed by: EMERGENCY MEDICINE

## 2021-02-11 PROCEDURE — 99283 EMERGENCY DEPT VISIT LOW MDM: CPT

## 2021-02-11 PROCEDURE — 83690 ASSAY OF LIPASE: CPT

## 2021-02-11 PROCEDURE — 36415 COLL VENOUS BLD VENIPUNCTURE: CPT

## 2021-02-11 PROCEDURE — 81001 URINALYSIS AUTO W/SCOPE: CPT

## 2021-02-11 PROCEDURE — 80053 COMPREHEN METABOLIC PANEL: CPT

## 2021-02-11 PROCEDURE — 6360000004 HC RX CONTRAST MEDICATION: Performed by: EMERGENCY MEDICINE

## 2021-02-11 PROCEDURE — 96374 THER/PROPH/DIAG INJ IV PUSH: CPT

## 2021-02-11 PROCEDURE — 96375 TX/PRO/DX INJ NEW DRUG ADDON: CPT

## 2021-02-11 RX ORDER — KETOROLAC TROMETHAMINE 30 MG/ML
30 INJECTION, SOLUTION INTRAMUSCULAR; INTRAVENOUS ONCE
Status: COMPLETED | OUTPATIENT
Start: 2021-02-11 | End: 2021-02-11

## 2021-02-11 RX ORDER — ONDANSETRON 2 MG/ML
4 INJECTION INTRAMUSCULAR; INTRAVENOUS ONCE
Status: COMPLETED | OUTPATIENT
Start: 2021-02-11 | End: 2021-02-11

## 2021-02-11 RX ORDER — 0.9 % SODIUM CHLORIDE 0.9 %
1000 INTRAVENOUS SOLUTION INTRAVENOUS ONCE
Status: COMPLETED | OUTPATIENT
Start: 2021-02-11 | End: 2021-02-12

## 2021-02-11 RX ADMIN — KETOROLAC TROMETHAMINE 30 MG: 30 INJECTION, SOLUTION INTRAMUSCULAR; INTRAVENOUS at 22:36

## 2021-02-11 RX ADMIN — HYDROMORPHONE HYDROCHLORIDE 1 MG: 1 INJECTION, SOLUTION INTRAMUSCULAR; INTRAVENOUS; SUBCUTANEOUS at 22:36

## 2021-02-11 RX ADMIN — SODIUM CHLORIDE 1000 ML: 9 INJECTION, SOLUTION INTRAVENOUS at 22:35

## 2021-02-11 RX ADMIN — IOPAMIDOL 100 ML: 612 INJECTION, SOLUTION INTRAVENOUS at 23:18

## 2021-02-11 RX ADMIN — ONDANSETRON 4 MG: 2 INJECTION INTRAMUSCULAR; INTRAVENOUS at 22:35

## 2021-02-11 ASSESSMENT — PAIN DESCRIPTION - FREQUENCY: FREQUENCY: CONTINUOUS

## 2021-02-11 ASSESSMENT — PAIN DESCRIPTION - DESCRIPTORS: DESCRIPTORS: ACHING;SHARP;SHOOTING

## 2021-02-11 ASSESSMENT — PAIN DESCRIPTION - PAIN TYPE: TYPE: ACUTE PAIN

## 2021-02-11 ASSESSMENT — PAIN DESCRIPTION - ORIENTATION: ORIENTATION: LOWER;LEFT;RIGHT

## 2021-02-11 ASSESSMENT — PAIN SCALES - GENERAL
PAINLEVEL_OUTOF10: 8
PAINLEVEL_OUTOF10: 7

## 2021-02-11 ASSESSMENT — PAIN DESCRIPTION - LOCATION: LOCATION: ABDOMEN

## 2021-02-12 VITALS
TEMPERATURE: 97.8 F | WEIGHT: 170 LBS | SYSTOLIC BLOOD PRESSURE: 121 MMHG | OXYGEN SATURATION: 99 % | HEART RATE: 72 BPM | BODY MASS INDEX: 28.29 KG/M2 | RESPIRATION RATE: 17 BRPM | DIASTOLIC BLOOD PRESSURE: 58 MMHG

## 2021-02-12 LAB
GFR AFRICAN AMERICAN: >60
GFR NON-AFRICAN AMERICAN: >60
PERFORMED ON: NORMAL
POC CREATININE: 0.8 MG/DL (ref 0.6–1.1)
POC SAMPLE TYPE: NORMAL

## 2021-02-12 RX ORDER — TRAMADOL HYDROCHLORIDE 50 MG/1
50 TABLET ORAL EVERY 6 HOURS PRN
Qty: 12 TABLET | Refills: 0 | Status: SHIPPED | OUTPATIENT
Start: 2021-02-12 | End: 2021-02-15

## 2021-02-12 ASSESSMENT — ENCOUNTER SYMPTOMS
ABDOMINAL PAIN: 1
COUGH: 0
SHORTNESS OF BREATH: 0
SORE THROAT: 0
ABDOMINAL DISTENTION: 0
CHEST TIGHTNESS: 0
PHOTOPHOBIA: 0
WHEEZING: 0
EYE DISCHARGE: 0
NAUSEA: 1
VOMITING: 0

## 2021-02-12 NOTE — ED TRIAGE NOTES
Pt presents from home with c/o vomiting. Onset x2 hours. Pt a&o x4. resp even. Skin tan, warm, dry. Afebrile. Pt reports pain to generalized lower abd. Reports nausea, no active emesis at present. Last bm this morning, regular in appearance.
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
## 2021-02-12 NOTE — ED NOTES
D/C instructions explained to patient who voices understanding. Patient is alert and oriented, skin is pink, warm, dry. Respirations are even and non-labored. Patient ambulatory from ED with no distress observed.      Otf Jarrell RN  02/12/21 8021

## 2021-12-01 ENCOUNTER — OFFICE VISIT (OUTPATIENT)
Dept: FAMILY MEDICINE CLINIC | Age: 26
End: 2021-12-01
Payer: COMMERCIAL

## 2021-12-01 VITALS
BODY MASS INDEX: 24.99 KG/M2 | OXYGEN SATURATION: 98 % | TEMPERATURE: 96.6 F | SYSTOLIC BLOOD PRESSURE: 118 MMHG | HEART RATE: 90 BPM | HEIGHT: 65 IN | DIASTOLIC BLOOD PRESSURE: 72 MMHG | WEIGHT: 150 LBS

## 2021-12-01 DIAGNOSIS — R10.84 GENERALIZED ABDOMINAL PAIN: Primary | ICD-10-CM

## 2021-12-01 DIAGNOSIS — R63.4 UNINTENDED WEIGHT LOSS: ICD-10-CM

## 2021-12-01 DIAGNOSIS — K59.09 CHRONIC CONSTIPATION: ICD-10-CM

## 2021-12-01 DIAGNOSIS — N83.201 CYSTS OF BOTH OVARIES: ICD-10-CM

## 2021-12-01 DIAGNOSIS — N83.202 CYSTS OF BOTH OVARIES: ICD-10-CM

## 2021-12-01 PROCEDURE — 4004F PT TOBACCO SCREEN RCVD TLK: CPT | Performed by: FAMILY MEDICINE

## 2021-12-01 PROCEDURE — G8420 CALC BMI NORM PARAMETERS: HCPCS | Performed by: FAMILY MEDICINE

## 2021-12-01 PROCEDURE — G8427 DOCREV CUR MEDS BY ELIG CLIN: HCPCS | Performed by: FAMILY MEDICINE

## 2021-12-01 PROCEDURE — G8484 FLU IMMUNIZE NO ADMIN: HCPCS | Performed by: FAMILY MEDICINE

## 2021-12-01 PROCEDURE — 99204 OFFICE O/P NEW MOD 45 MIN: CPT | Performed by: FAMILY MEDICINE

## 2021-12-01 RX ORDER — LUBIPROSTONE 24 UG/1
24 CAPSULE, GELATIN COATED ORAL
Qty: 30 CAPSULE | Refills: 5 | Status: SHIPPED | OUTPATIENT
Start: 2021-12-01 | End: 2022-04-15

## 2021-12-01 SDOH — ECONOMIC STABILITY: FOOD INSECURITY: WITHIN THE PAST 12 MONTHS, THE FOOD YOU BOUGHT JUST DIDN'T LAST AND YOU DIDN'T HAVE MONEY TO GET MORE.: NEVER TRUE

## 2021-12-01 SDOH — ECONOMIC STABILITY: FOOD INSECURITY: WITHIN THE PAST 12 MONTHS, YOU WORRIED THAT YOUR FOOD WOULD RUN OUT BEFORE YOU GOT MONEY TO BUY MORE.: NEVER TRUE

## 2021-12-01 ASSESSMENT — PATIENT HEALTH QUESTIONNAIRE - PHQ9
1. LITTLE INTEREST OR PLEASURE IN DOING THINGS: 0
SUM OF ALL RESPONSES TO PHQ QUESTIONS 1-9: 0
SUM OF ALL RESPONSES TO PHQ QUESTIONS 1-9: 0
SUM OF ALL RESPONSES TO PHQ9 QUESTIONS 1 & 2: 0
2. FEELING DOWN, DEPRESSED OR HOPELESS: 0
SUM OF ALL RESPONSES TO PHQ QUESTIONS 1-9: 0

## 2021-12-01 ASSESSMENT — SOCIAL DETERMINANTS OF HEALTH (SDOH): HOW HARD IS IT FOR YOU TO PAY FOR THE VERY BASICS LIKE FOOD, HOUSING, MEDICAL CARE, AND HEATING?: NOT HARD AT ALL

## 2021-12-01 NOTE — PROGRESS NOTES
Subjective  Anahi Littlejohn, 27 y.o. female presents today with:  Chief Complaint   Patient presents with    Abdominal Pain     patient states that she has lost several pounds, ER MRI possible cyst causing pain            HPI    For about 10 months has had intermittent sharp, shooting pain in central abdomen, nonradiating lasting for minutes to an hour. Has associated nausea but no vomiting. No diarrhea. Does complain of chronic constipation since middle school. Has to strain to have BM. Has blood in stool. Never feels urge to have bowel movement. Goes days to week without having a BM. Unchanging at this time. Stool is light brown and sometimes dark green. Has noticed blood mixed in stool. Stools are small and hard. No FH of IBD or Hirschsprung's. Miralax did not help. No enemas. ExLax no help. No metamucil or Colace. No Lactulose. No Linzess. Weight loss - while pregnant, gained no weight; had baby 11 months ago and has lost 40 pounds without trying. Is eating normally if not in excess. No other questions and or concerns for today's visit    Review of Systems No rashes now. Had rash while pregnant - possibly intertrigo (bumps under breasts). No painful, swollen or tender joints.        Past Medical History:   Diagnosis Date    Anemia     Asthma 2018     Past Surgical History:   Procedure Laterality Date     SECTION N/A 2017     SECTION performed by Kerry R[facility]     SECTION N/A 2020     SECTION performed by Bony Sagastume DO at [facility]    TONSILLECTOMY AND ADENOIDECTOMY       Social History     Socioeconomic History    Marital status: Single     Spouse name: Not on file    Number of children: Not on file    Years of education: Not on file    Highest education level: Not on file   Occupational History    Not on file   Tobacco Use    Smoking status: Current Every Day Smoker     Packs/day: 0.50     Years: 4.00     Pack years: 2.00     Types: Cigarettes    Smokeless tobacco: Current User   Vaping Use    Vaping Use: Never used   Substance and Sexual Activity    Alcohol use: No    Drug use: Yes     Types: Marijuana Lucianne Bars)     Comment: at begening of pregnancy     Sexual activity: Yes     Partners: Male   Other Topics Concern    Not on file   Social History Narrative    Not on file     Social Determinants of Health     Financial Resource Strain: Low Risk     Difficulty of Paying Living Expenses: Not hard at all   Food Insecurity: No Food Insecurity    Worried About Running Out of Food in the Last Year: Never true    [address] in the Last Year: Never true   Transportation Needs:     Lack of Transportation (Medical): Not on file    Lack of Transportation (Non-Medical):  Not on file   Physical Activity:     Days of Exercise per Week: Not on file    Minutes of Exercise per Session: Not on file   Stress:     Feeling of Stress : Not on file   Social Connections:     Frequency of Communication with Friends and Family: Not on file    Frequency of Social Gatherings with Friends and Family: Not on file    Attends [employer]ism Services: Not on file    Active Member of [address] [place] [address] or Organizations: Not on file    Attends Club or Organization Meetings: Not on file    Marital Status: Not on file   Intimate Partner Violence:     Fear of Current or Ex-Partner: Not on file    Emotionally Abused: Not on file    Physically Abused: Not on file    Sexually Abused: Not on file   Housing Stability:     Unable to Pay for Housing in the Last Year: Not on file    Number of [place] in the Last Year: Not on file    Unstable Housing in the Last Year: Not on file     Family History   Problem Relation Age of Onset    Breast Cancer Neg Hx      No Known Allergies  Current Outpatient Medications   Medication Sig Dispense Refill    lubiprostone (AMITIZA) 24 MCG capsule Take 1 capsule by mouth daily (with breakfast) 30 capsule 5     No current facility-administered medications for this visit. PMH, Surgical Hx, Family Hx, and Social Hxreviewed and updated. Health Maintenance reviewed. Objective    Vitals:    12/01/21 1325   BP: 118/72   Pulse: 90   Temp: 96.6 °F (35.9 °C)   TempSrc: Temporal   SpO2: 98%   Weight: 150 lb (68 kg)   Height: 5' 5\" (1.651 m)        Physical Exam  Constitutional:       General: She is not in acute distress. Appearance: She is well-developed. HENT:      Head: Normocephalic and atraumatic. Eyes:      General: No scleral icterus. Conjunctiva/sclera: Conjunctivae normal.   Cardiovascular:      Rate and Rhythm: Normal rate and regular rhythm. Heart sounds: Normal heart sounds. Pulmonary:      Effort: Pulmonary effort is normal. No respiratory distress. Breath sounds: Normal breath sounds. No wheezing or rales. Abdominal:      General: Bowel sounds are normal. There is no distension. Palpations: Abdomen is soft. There is no mass. Tenderness: There is abdominal tenderness (mild RLQ). There is no guarding or rebound. Hernia: No hernia is present. Skin:     General: Skin is warm and dry. Neurological:      Mental Status: She is alert and oriented to person, place, and time. Psychiatric:         Mood and Affect: Mood normal.         Behavior: Behavior normal.         Thought Content: Thought content normal.         Judgment: Judgment normal.           Lab Results   Component Value Date    LABA1C 5.1 05/07/2020    LABA1C 5.5 12/17/2014     Lab Results   Component Value Date    CREATININE 0.8 02/11/2021     Lab Results   Component Value Date    ALT 17 02/11/2021    AST 19 02/11/2021     Lab Results   Component Value Date    CHOL 203 (H) 12/17/2014    TRIG 83 12/17/2014    HDL 53 12/17/2014    LDLCALC 133 (H) 12/17/2014        Assessment & Plan   Visit Diagnoses and Associated Orders     Generalized abdominal pain    -  Primary    Suspect it is related to chronic constipation. However, weight loss is not well explained. Labs, CT, pelvic ultrasound. Referred to [address] [facility], [place] [[id number] Custom]      CT ABDOMEN PELVIS W WO CONTRAST Additional Contrast? Radiologist Recommendation [[id number] Custom]   - Future Order         Unintended weight loss        Unclear etiology. Labs, pelvic CT, pelvic, GI referral        TSH Without Reflex [[id number] Custom]   - Future Order    Comprehensive Metabolic Panel [[id number] Custom]   - Future Order    CBC With Auto Differential [[id number] Custom]   - Future Order    Calprotectin Stool [[id number] Custom]   - Future Order    [facility], [facility] Custom]           Chronic constipation        Will titrate as tolerated. Ice feels flex and MiraLAX. Add Amitiza    TSH Without Reflex [id number] Custom]   - Future Order    Calprotectin Stool [[id number] Custom]   - Future Order    [facility], [facility], [facility] Custom]           Cysts of both ovaries        US PELVIS COMPLETE [[id number] Custom]   - Future Order    US NON OB TRANSVAGINAL [[id number] Custom]   - Future Order         ORDERS WITHOUT AN ASSOCIATED DIAGNOSIS    lubiprostone (Ines Cameron) 24 MCG capsule [[id number]]        Time spent on appointment included reviewing past laboratory and radiographic results, previous notes, consultations, past procedures, medications, obtaining history and performing physical, formulating mutually agreed upon plan of care with patient 45 min. Reviewed with the patient: all disease processes, current clinical status, medications, activities and diet.      Side effects, adverse effects of the medication prescribed today, as well as treatment plan/ rationale and result expectations have been discussed with the patient who expresses understanding and desires to proceed.     Close follow up to evaluate treatment results and for coordination of care.   I have reviewed the patient's medical history in detail and updated the computerized patient record. More than 50% of the appointment was spent in face-to-face counseling, education and care coordination. Orders Placed This Encounter   Procedures    CT ABDOMEN PELVIS W WO CONTRAST Additional Contrast? Radiologist Recommendation     Standing Status:   Future     Standing Expiration Date:   12/1/2022     Order Specific Question:   Additional Contrast?     Answer:   Radiologist Recommendation     Order Specific Question:   Reason for exam:     Answer:   central abd pain, unintentional weight loss, severe constipation    US PELVIS COMPLETE     This procedure can be scheduled via wishkicker. Access your wishkicker account by visiting [email or web address]. Standing Status:   Future     Standing Expiration Date:   12/1/2022    US NON OB TRANSVAGINAL     This procedure can be scheduled via wishkicker. Access your [facility]kicker account by visiting [email or web address].      Standing Status:   Future     Standing Expiration Date:   12/1/2022     Order Specific Question:   Reason for exam:     Answer:   abdominal pain, previous abnl finding on CT requiring f/u    TSH Without Reflex     Standing Status:   Future     Standing Expiration Date:   12/1/2022    Comprehensive Metabolic Panel     Standing Status:   Future     Standing Expiration Date:   3/1/2022    CBC With Auto Differential     Standing Status:   Future     Standing Expiration Date:   12/1/2022    Calprotectin Stool     Standing Status:   Future     Standing Expiration Date:   12/1/2022    [facility], [place]     Referral Priority:   Routine     Referral Type:   Eval and Treat     Referral Reason:   Specialty Services Required     Referred to Provider:   Janiya Li MD     Requested Specialty:   Gastroenterology     Number of Visits Requested:   1     Orders Placed This Encounter   Medications    lubiprostone (AMITIZA) 24 MCG capsule     Sig: Take 1 capsule by mouth daily (with breakfast)     Dispense:  30 capsule     Refill:  5     Medications Discontinued During This Encounter   Medication Reason    Prenatal Vit-Fe Fumarate-FA (PRENATAL VITAMIN) 27-0.8 MG TABS LIST CLEANUP    ferrous sulfate (FE TABS) 325 (65 Fe) MG EC tablet Therapy completed    ibuprofen (ADVIL;MOTRIN) 600 MG tablet LIST CLEANUP     Return in about 6 weeks (around 1/12/2022) for constipation, abd pain, weight loss - doxy. Controlled Substance Monitoring:    Acute and Chronic Pain Monitoring:   No flowsheet data found.         Can Rodriges MD

## 2022-04-14 ENCOUNTER — HOSPITAL ENCOUNTER (OUTPATIENT)
Dept: ULTRASOUND IMAGING | Age: 27
Discharge: HOME | End: 2022-04-16
Payer: COMMERCIAL

## 2022-04-14 DIAGNOSIS — N83.201 CYSTS OF BOTH OVARIES: ICD-10-CM

## 2022-04-14 DIAGNOSIS — N83.202 CYSTS OF BOTH OVARIES: ICD-10-CM

## 2022-04-14 PROCEDURE — 76830 TRANSVAGINAL US NON-OB: CPT

## 2022-04-14 PROCEDURE — 76856 US EXAM PELVIC COMPLETE: CPT

## 2022-04-15 ENCOUNTER — OFFICE VISIT (OUTPATIENT)
Dept: FAMILY MEDICINE CLINIC | Age: 27
End: 2022-04-15
Payer: COMMERCIAL

## 2022-04-15 VITALS
BODY MASS INDEX: 28.66 KG/M2 | WEIGHT: 172 LBS | SYSTOLIC BLOOD PRESSURE: 118 MMHG | RESPIRATION RATE: 16 BRPM | HEIGHT: 65 IN | HEART RATE: 91 BPM | OXYGEN SATURATION: 98 % | DIASTOLIC BLOOD PRESSURE: 72 MMHG

## 2022-04-15 DIAGNOSIS — Z71.6 ENCOUNTER FOR SMOKING CESSATION COUNSELING: ICD-10-CM

## 2022-04-15 DIAGNOSIS — Z20.2 STD EXPOSURE: ICD-10-CM

## 2022-04-15 DIAGNOSIS — K59.04 CHRONIC IDIOPATHIC CONSTIPATION: ICD-10-CM

## 2022-04-15 DIAGNOSIS — N94.0 OVULATION PAIN: Primary | ICD-10-CM

## 2022-04-15 PROCEDURE — G8427 DOCREV CUR MEDS BY ELIG CLIN: HCPCS | Performed by: PHYSICIAN ASSISTANT

## 2022-04-15 PROCEDURE — 99214 OFFICE O/P EST MOD 30 MIN: CPT | Performed by: PHYSICIAN ASSISTANT

## 2022-04-15 PROCEDURE — 4004F PT TOBACCO SCREEN RCVD TLK: CPT | Performed by: PHYSICIAN ASSISTANT

## 2022-04-15 PROCEDURE — G8419 CALC BMI OUT NRM PARAM NOF/U: HCPCS | Performed by: PHYSICIAN ASSISTANT

## 2022-04-15 RX ORDER — DOCUSATE SODIUM 100 MG/1
100 CAPSULE, LIQUID FILLED ORAL 2 TIMES DAILY
Qty: 60 CAPSULE | Refills: 5 | Status: SHIPPED | OUTPATIENT
Start: 2022-04-15 | End: 2022-05-15

## 2022-04-15 RX ORDER — NICOTINE 21 MG/24HR
1 PATCH, TRANSDERMAL 24 HOURS TRANSDERMAL EVERY 24 HOURS
Qty: 30 PATCH | Refills: 3 | Status: SHIPPED | OUTPATIENT
Start: 2022-04-15 | End: 2023-04-15

## 2022-04-15 RX ORDER — DROSPIRENONE AND ETHINYL ESTRADIOL 0.02-3(28)
1 KIT ORAL DAILY
Qty: 28 TABLET | Refills: 5 | Status: SHIPPED | OUTPATIENT
Start: 2022-04-15

## 2022-04-15 RX ORDER — GREEN TEA/HOODIA GORDONII 315-12.5MG
1 CAPSULE ORAL DAILY
Qty: 90 TABLET | Refills: 4 | Status: SHIPPED | OUTPATIENT
Start: 2022-04-15 | End: 2022-05-05

## 2022-04-15 RX ORDER — POLYETHYLENE GLYCOL 3350 17 G/17G
17 POWDER, FOR SOLUTION ORAL DAILY
Qty: 1530 G | Refills: 1 | Status: SHIPPED | OUTPATIENT
Start: 2022-04-15 | End: 2022-05-05

## 2022-04-15 ASSESSMENT — ENCOUNTER SYMPTOMS
DIARRHEA: 0
RECTAL PAIN: 0
COLOR CHANGE: 0
COUGH: 0
CHEST TIGHTNESS: 0
VOMITING: 0
ANAL BLEEDING: 0
CONSTIPATION: 1
ABDOMINAL DISTENTION: 1
NAUSEA: 0
ABDOMINAL PAIN: 1
BLOOD IN STOOL: 0

## 2022-04-15 NOTE — PROGRESS NOTES
Subjective  Ginette Woodard, 27 y.o. female presents today with:  Chief Complaint   Patient presents with    Annual Exam     general check up     Exposure to STD     would like to be tested for STD. possible exposure      HPI  Luis Louis is in the office to establish care. Would like some STI testing due to new partner. Possible exposure. Denies vaginal discharge, abnormal bleeding, pain with sex. Has been having recurrent pelvic pain for several years. Unilateral at times. Recently had transvaginal and pelvic u/s as ordered by DR. Cerrato. Had imaging done 22. Results not in. Will have bloating/pain. Has gone to ED for symptoms. Periods are irregular. Due to start tomorrow. Not currently on any form of hormone replacement for symptoms. She does smoke about 1/2 PPD. Obstetric history:  (son 3, daughter 1.5)    No known history of DVT/clotting disorder. No history of HTN. History of idiopathic, chronic constipation. No bowel regimen in the past.  Denies melena or hematochezia. Will have hard, firm stool every couple of days. No known hemorrhoids    Review of Systems   Constitutional: Negative for activity change, appetite change, diaphoresis, fatigue, fever and unexpected weight change. HENT: Negative for congestion. Respiratory: Negative for cough and chest tightness. Cardiovascular: Negative for chest pain, palpitations and leg swelling. Gastrointestinal: Positive for abdominal distention, abdominal pain and constipation. Negative for anal bleeding, blood in stool, diarrhea, nausea, rectal pain and vomiting. Genitourinary: Positive for menstrual problem (irregular) and pelvic pain. Negative for decreased urine volume, difficulty urinating, dyspareunia, dysuria, flank pain, frequency, genital sores, hematuria, urgency, vaginal bleeding, vaginal discharge and vaginal pain. Musculoskeletal: Negative for arthralgias. Skin: Negative for color change. Neurological: Negative for dizziness and weakness. Psychiatric/Behavioral: Negative for dysphoric mood and sleep disturbance. The patient is not nervous/anxious. Past Medical History:   Diagnosis Date    Anemia     Asthma 2018     Past Surgical History:   Procedure Laterality Date     SECTION N/A 2017     SECTION performed [facility]lli Reeder [place] [facility] [place] [facility] [place]     SECTION N/A 2020     SECTION performed by Fani Graves DO at [facility]    TONSILLECTOMY AND ADENOIDECTOMY       Social History     Socioeconomic History    Marital status: Single     Spouse name: Not on file    Number of children: Not on file    Years of education: Not on file    Highest education level: Not on file   Occupational History    Not on file   Tobacco Use    Smoking status: Current Every Day Smoker     Packs/day: 0.50     Years: 4.00     Pack years: 2.00     Types: Cigarettes    Smokeless tobacco: Current User   Vaping Use    Vaping Use: Never used   Substance and Sexual Activity    Alcohol use: No    Drug use: Yes     Types: Marijuana (Weed)     Comment: at begening of pregnancy     Sexual activity: Yes     Partners: Male   Other Topics Concern    Not on file   Social History Narrative    Not on file     Social Determinants of Health     Financial Resource Strain: Low Risk     Difficulty of Paying Living Expenses: Not hard at all   Food Insecurity: No Food Insecurity    Worried About Running Out of Food in the Last Year: Never true    [place] of Food in the Last Year: Never true   Transportation Needs:     Lack of Transportation (Medical): Not on file    Lack of Transportation (Non-Medical):  Not on file   Physical Activity:     Days of Exercise per Week: Not on file    Minutes of Exercise per Session: Not on file   Stress:     Feeling of Stress : Not on file   Social Connections:     Frequency of Communication with Friends and Family: Not on file    Frequency of Social Gatherings with Friends and Family: Not on file    Attends Anabaptism Services: Not on file    Active Member of Clubs or Organizations: Not on file    Attends Club or Organization Meetings: Not on file    Marital Status: Not on file   Intimate Partner Violence:     Fear of Current or Ex-Partner: Not on file    Emotionally Abused: Not on file    Physically Abused: Not on file    Sexually Abused: Not on file   Housing Stability:     Unable to Pay for Housing in the Last Year: Not on file    Number of [place] in the Last Year: Not on file    Unstable Housing in the Last Year: Not on file     Family History   Problem Relation Age of Onset    Breast Cancer Neg Hx      No Known Allergies  Current Outpatient Medications   Medication Sig Dispense Refill    nicotine (NICODERM CQ) 14 MG/24HR Place 1 patch onto the skin every 24 hours 30 patch 3    drospirenone-ethinyl estradiol (DAI) 3-0.02 MG per tablet Take 1 tablet by mouth daily 28 tablet 5    polyethylene glycol (GLYCOLAX) 17 GM/SCOOP powder Take 17 g by mouth daily 1530 g 1    Probiotic Acidophilus (FLORANEX) TABS Take 1 tablet by mouth daily 90 tablet 4    docusate sodium (COLACE) 100 MG capsule Take 1 capsule by mouth 2 times daily 60 capsule 5     No current facility-administered medications for this visit. PMH, Surgical Hx, Family Hx, and Social Hx reviewed and updated. Health Maintenance reviewed. Objective  Vitals:    04/15/22 1059   BP: 118/72   Site: Left Upper Arm   Position: Sitting   Cuff Size: Medium Adult   Pulse: 91   Resp: 16   SpO2: 98%   Weight: 172 lb (78 kg)   Height: 5' 5\" (1.651 m)     BP Readings from Last 3 Encounters:   04/15/22 118/72   12/01/21 118/72   02/12/21 (!) 121/58     Wt Readings from Last 3 Encounters:   04/15/22 172 lb (78 kg)   12/01/21 150 lb (68 kg)   02/11/21 170 lb (77.1 kg)     Physical Exam  Constitutional:       Appearance: Normal appearance.    HENT:      Head: Normocephalic and atraumatic. Right Ear: External ear normal.      Left Ear: External ear normal.      Mouth/Throat:      Mouth: Mucous membranes are moist.   Eyes:      Conjunctiva/sclera: Conjunctivae normal.   Cardiovascular:      Rate and Rhythm: Normal rate and regular rhythm. Pulmonary:      Effort: Pulmonary effort is normal.      Breath sounds: Normal breath sounds. Abdominal:      Palpations: Abdomen is soft. Skin:     General: Skin is warm and dry. Neurological:      General: No focal deficit present. Mental Status: She is alert and oriented to person, place, and time. Psychiatric:         Mood and Affect: Mood normal.         Behavior: Behavior normal.         Thought Content: Thought content normal.         Judgment: Judgment normal.       Assessment & Plan   Mannie Evans was seen today for annual exam and exposure to std. Diagnoses and all orders for this visit:    Ovulation pain  -     drospirenone-ethinyl estradiol (DAI) 3-0.02 MG per tablet; Take 1 tablet by mouth daily    Encounter for smoking cessation counseling  -     nicotine (NICODERM CQ) 14 MG/24HR; Place 1 patch onto the skin every 24 hours    STD exposure  -     C.trachomatis N.gonorrhoeae DNA, Urine; Future  -     Trichomonas Vaginalis Rna, Qual Tma, Pap Via; Future    Chronic idiopathic constipation  -     polyethylene glycol (GLYCOLAX) 17 GM/SCOOP powder; Take 17 g by mouth daily  -     Probiotic Acidophilus (FLORANEX) TABS; Take 1 tablet by mouth daily  -     docusate sodium (COLACE) 100 MG capsule; Take 1 capsule by mouth 2 times daily    Await u/s results/imaging. STI screen. Start OCP see if pelvic pain improves. Bowel regimen. 6 week follow up with me.     Orders Placed This Encounter   Procedures    C.trachomatis N.gonorrhoeae DNA, Urine     Standing Status:   Future     Standing Expiration Date:   4/15/2023    Trichomonas Vaginalis Rna, Qual Tma, Pap Via     Standing Status:   Future     Standing Expiration Date: 4/15/2023     Orders Placed This Encounter   Medications    nicotine (NICODERM CQ) 14 MG/24HR     Sig: Place 1 patch onto the skin every 24 hours     Dispense:  30 patch     Refill:  3    drospirenone-ethinyl estradiol (DAI) 3-0.02 MG per tablet     Sig: Take 1 tablet by mouth daily     Dispense:  28 tablet     Refill:  5    polyethylene glycol (GLYCOLAX) 17 GM/SCOOP powder     Sig: Take 17 g by mouth daily     Dispense:  1530 g     Refill:  1    Probiotic Acidophilus (FLORANEX) TABS     Sig: Take 1 tablet by mouth daily     Dispense:  90 tablet     Refill:  4    docusate sodium (COLACE) 100 MG capsule     Sig: Take 1 capsule by mouth 2 times daily     Dispense:  60 capsule     Refill:  5     Medications Discontinued During This Encounter   Medication Reason    lubiprostone (AMITIZA) 24 MCG capsule LIST CLEANUP     Return in about 6 weeks (around 5/27/2022) for follow up in office. Reviewed with the patient: current clinical status, medications, activities and diet. Side effects, adverse effects of the medication prescribed today, as well as treatment plan/ rationale and result expectations have been discussed with the patient who expresses understanding and desires to proceed. Close follow up to evaluate treatment results and for coordination of care. I have reviewed the patient's medical history in detail and updated the computerized patient record.     Meche Stephens PA-C

## 2022-04-18 LAB
SPECIMEN SOURCE: NORMAL
T. VAGINALIS AMPLIFIED: NEGATIVE

## 2022-04-20 LAB
C. TRACHOMATIS DNA ,URINE: NEGATIVE
N. GONORRHOEAE DNA, URINE: POSITIVE

## 2022-04-22 ENCOUNTER — TELEPHONE (OUTPATIENT)
Dept: FAMILY MEDICINE CLINIC | Age: 27
End: 2022-04-22

## 2022-04-22 ENCOUNTER — NURSE ONLY (OUTPATIENT)
Dept: FAMILY MEDICINE CLINIC | Age: 27
End: 2022-04-22
Payer: COMMERCIAL

## 2022-04-22 DIAGNOSIS — Z20.2 STD EXPOSURE: Primary | ICD-10-CM

## 2022-04-22 PROCEDURE — 96372 THER/PROPH/DIAG INJ SC/IM: CPT | Performed by: PHYSICIAN ASSISTANT

## 2022-04-22 RX ORDER — CEFTRIAXONE 1 G/1
1000 INJECTION, POWDER, FOR SOLUTION INTRAMUSCULAR; INTRAVENOUS ONCE
Status: COMPLETED | OUTPATIENT
Start: 2022-04-22 | End: 2022-04-22

## 2022-04-22 RX ADMIN — CEFTRIAXONE 1000 MG: 1 INJECTION, POWDER, FOR SOLUTION INTRAMUSCULAR; INTRAVENOUS at 17:20

## 2022-04-22 NOTE — PROGRESS NOTES
Pt in office for rocephin injection per Juliann Scott For STD exposure/ Positive STD screen   Pt tolerated well   Given in right glute

## 2022-04-22 NOTE — TELEPHONE ENCOUNTER
----- Message from [place] [facility] [place] sent at 4/22/2022  7:51 AM EDT -----  Subject: Message to Provider    QUESTIONS  Information for Provider? Pt calling to speak with someone in the office   about her medication, please advise.  ---------------------------------------------------------------------------  --------------  CALL BACK INFO  What is the best way for the office to contact you? OK to leave message on   voicemail  Preferred Call Back Phone Number? [phone]  ---------------------------------------------------------------------------  --------------  SCRIPT ANSWERS  Relationship to Patient?  Self

## 2022-04-22 NOTE — TELEPHONE ENCOUNTER
Pt calling in regards to her recent positive test results would like to know if an antibiotic is going to be sent to her pharmacy today       Pharmacy [facility] on [address]

## 2022-05-05 ENCOUNTER — OFFICE VISIT (OUTPATIENT)
Dept: FAMILY MEDICINE CLINIC | Age: 27
End: 2022-05-05
Payer: COMMERCIAL

## 2022-05-05 VITALS
SYSTOLIC BLOOD PRESSURE: 120 MMHG | HEIGHT: 65 IN | WEIGHT: 172 LBS | OXYGEN SATURATION: 98 % | BODY MASS INDEX: 28.66 KG/M2 | DIASTOLIC BLOOD PRESSURE: 70 MMHG | HEART RATE: 92 BPM | RESPIRATION RATE: 16 BRPM

## 2022-05-05 DIAGNOSIS — A54.9 GONORRHEA: ICD-10-CM

## 2022-05-05 DIAGNOSIS — A54.9 GONORRHEA: Primary | ICD-10-CM

## 2022-05-05 PROCEDURE — 4004F PT TOBACCO SCREEN RCVD TLK: CPT | Performed by: PHYSICIAN ASSISTANT

## 2022-05-05 PROCEDURE — G8427 DOCREV CUR MEDS BY ELIG CLIN: HCPCS | Performed by: PHYSICIAN ASSISTANT

## 2022-05-05 PROCEDURE — G8419 CALC BMI OUT NRM PARAM NOF/U: HCPCS | Performed by: PHYSICIAN ASSISTANT

## 2022-05-05 PROCEDURE — 99213 OFFICE O/P EST LOW 20 MIN: CPT | Performed by: PHYSICIAN ASSISTANT

## 2022-05-05 ASSESSMENT — ENCOUNTER SYMPTOMS
DIARRHEA: 0
NAUSEA: 0
ANAL BLEEDING: 0
CONSTIPATION: 1
BLOOD IN STOOL: 0
RECTAL PAIN: 0
CHEST TIGHTNESS: 0
COUGH: 0
ABDOMINAL PAIN: 0
ABDOMINAL DISTENTION: 0
COLOR CHANGE: 0
VOMITING: 0

## 2022-05-05 NOTE — PROGRESS NOTES
Subjective  Clayton Aguilar, 27 y.o. female presents today with:  Chief Complaint   Patient presents with    Follow-up     f/u after positive STD      HPI  Mannie Evans is in the office today for follow up. Last OV with me: 4/15/22    +STD--  Treated for gonorrhea with rocephin injection. In office today for follow up. No longer having pelvic pain/symptoms. Denies vaginal bleeding/discharge. Would like confirmation of resolution. Review of Systems   Constitutional: Negative for activity change, appetite change, diaphoresis, fatigue, fever and unexpected weight change. HENT: Negative for congestion. Respiratory: Negative for cough and chest tightness. Cardiovascular: Negative for chest pain, palpitations and leg swelling. Gastrointestinal: Positive for constipation (intermittent). Negative for abdominal distention, abdominal pain, anal bleeding, blood in stool, diarrhea, nausea, rectal pain and vomiting. Genitourinary: Positive for menstrual problem (irregular). Negative for decreased urine volume, difficulty urinating, dyspareunia, dysuria, flank pain, frequency, genital sores, hematuria, pelvic pain, urgency, vaginal bleeding, vaginal discharge and vaginal pain. Musculoskeletal: Negative for arthralgias. Skin: Negative for color change. Neurological: Negative for dizziness and weakness. Psychiatric/Behavioral: Negative for dysphoric mood and sleep disturbance. The patient is not nervous/anxious.       Past Medical History:   Diagnosis Date    Anemia     Asthma 2018     Past Surgical History:   Procedure Laterality Date     SECTION N/A 2017     SECTION performed by Yue Lovelace MD at [facility] [place] [facility]     SECTION N/A 2020     SECTION performed by Yvonne Larkin DO at [facility] [place] [facility]    TONSILLECTOMY AND ADENOIDECTOMY       Social History     Socioeconomic History    Marital status: Single     Spouse name: Not on file    Number of children: Not on file    Years of education: Not on file    Highest education level: Not on file   Occupational History    Not on file   Tobacco Use    Smoking status: Current Every Day Smoker     Packs/day: 0.50     Years: 4.00     Pack years: 2.00     Types: Cigarettes    Smokeless tobacco: Current User   Vaping Use    Vaping Use: Never used   Substance and Sexual Activity    Alcohol use: No    Drug use: Yes     Types: Marijuana (Weed)     Comment: at begening of pregnancy     Sexual activity: Yes     Partners: Male   Other Topics Concern    Not on file   Social History Narrative    Not on file     Social Determinants of Health     Financial Resource Strain: Low Risk     Difficulty of Paying Living Expenses: Not hard at all   Food Insecurity: No Food Insecurity    Worried About Running Out of Food in the Last Year: Never true    [place] of Food in the Last Year: Never true   Transportation Needs:     Lack of Transportation (Medical): Not on file    Lack of Transportation (Non-Medical):  Not on file   Physical Activity:     Days of Exercise per Week: Not on file    Minutes of Exercise per Session: Not on file   Stress:     Feeling of Stress : Not on file   Social Connections:     Frequency of Communication with Friends and Family: Not on file    Frequency of Social Gatherings with Friends and Family: Not on file    Attends Restoration Services: Not on file    Active Member of [address] [place] [address] or Organizations: Not on file    Attends Club or Organization Meetings: Not on file    Marital Status: Not on file   Intimate Partner Violence:     Fear of Current or Ex-Partner: Not on file    Emotionally Abused: Not on file    Physically Abused: Not on file    Sexually Abused: Not on file   Housing Stability:     Unable to Pay for Housing in the Last Year: Not on file    Number of [place] in the Last Year: Not on file    Unstable Housing in the Last Year: Not on file     Family History   Problem Relation Age of Onset    Breast Cancer Neg Hx      No Known Allergies  Current Outpatient Medications   Medication Sig Dispense Refill    nicotine (NICODERM CQ) 14 MG/24HR Place 1 patch onto the skin every 24 hours 30 patch 3    drospirenone-ethinyl estradiol (DAI) 3-0.02 MG per tablet Take 1 tablet by mouth daily 28 tablet 5    docusate sodium (COLACE) 100 MG capsule Take 1 capsule by mouth 2 times daily 60 capsule 5     No current facility-administered medications for this visit. PMH, Surgical Hx, Family Hx, and Social Hx reviewed and updated. Health Maintenance reviewed. Objective  Vitals:    05/05/22 1106   BP: 120/70   Site: Left Upper Arm   Position: Sitting   Cuff Size: Medium Adult   Pulse: 92   Resp: 16   SpO2: 98%   Weight: 172 lb (78 kg)   Height: 5' 5\" (1.651 m)     BP Readings from Last 3 Encounters:   05/05/22 120/70   04/15/22 118/72   12/01/21 118/72     Wt Readings from Last 3 Encounters:   05/05/22 172 lb (78 kg)   04/15/22 172 lb (78 kg)   12/01/21 150 lb (68 kg)     Physical Exam  Constitutional:       Appearance: Normal appearance. HENT:      Head: Normocephalic and atraumatic. Right Ear: External ear normal.      Left Ear: External ear normal.      Mouth/Throat:      Mouth: Mucous membranes are moist.   Eyes:      Conjunctiva/sclera: Conjunctivae normal.   Cardiovascular:      Rate and Rhythm: Normal rate and regular rhythm. Pulmonary:      Effort: Pulmonary effort is normal.      Breath sounds: Normal breath sounds. Abdominal:      Palpations: Abdomen is soft. Skin:     General: Skin is warm and dry. Neurological:      General: No focal deficit present. Mental Status: She is alert and oriented to person, place, and time. Psychiatric:         Mood and Affect: Mood normal.         Behavior: Behavior normal.         Thought Content:  Thought content normal.         Judgment: Judgment normal.       Assessment & Plan   Neha Burt was seen today for follow-up. Diagnoses and all orders for this visit:    Gonorrhea  -     C.trachomatis N.gonorrhoeae DNA, Urine; Future    Repeat urine today. Contact office with any questions or concerns. Orders Placed This Encounter   Procedures    C.trachomatis N.gonorrhoeae DNA, Urine     Standing Status:   Future     Standing Expiration Date:   5/5/2023     No orders of the defined types were placed in this encounter. Medications Discontinued During This Encounter   Medication Reason    polyethylene glycol (GLYCOLAX) 17 GM/SCOOP powder LIST CLEANUP    Probiotic Acidophilus (FLORANEX) TABS LIST CLEANUP     No follow-ups on file. Reviewed with the patient: current clinical status, medications, activities and diet. Side effects, adverse effects of the medication prescribed today, as well as treatment plan/ rationale and result expectations have been discussed with the patient who expresses understanding and desires to proceed. Close follow up to evaluate treatment results and for coordination of care. I have reviewed the patient's medical history in detail and updated the computerized patient record.     Meche Stephens PA-C

## 2022-05-10 LAB
C. TRACHOMATIS DNA ,URINE: NEGATIVE
N. GONORRHOEAE DNA, URINE: NEGATIVE

## 2023-06-29 SDOH — ECONOMIC STABILITY: FOOD INSECURITY: WITHIN THE PAST 12 MONTHS, THE FOOD YOU BOUGHT JUST DIDN'T LAST AND YOU DIDN'T HAVE MONEY TO GET MORE.: NEVER TRUE

## 2023-06-29 SDOH — ECONOMIC STABILITY: TRANSPORTATION INSECURITY
IN THE PAST 12 MONTHS, HAS LACK OF TRANSPORTATION KEPT YOU FROM MEETINGS, WORK, OR FROM GETTING THINGS NEEDED FOR DAILY LIVING?: NO

## 2023-06-29 SDOH — ECONOMIC STABILITY: HOUSING INSECURITY
IN THE LAST 12 MONTHS, WAS THERE A TIME WHEN YOU DID NOT HAVE A STEADY PLACE TO SLEEP OR SLEPT IN A SHELTER (INCLUDING NOW)?: NO

## 2023-06-29 SDOH — ECONOMIC STABILITY: FOOD INSECURITY: WITHIN THE PAST 12 MONTHS, YOU WORRIED THAT YOUR FOOD WOULD RUN OUT BEFORE YOU GOT MONEY TO BUY MORE.: NEVER TRUE

## 2023-06-29 SDOH — ECONOMIC STABILITY: INCOME INSECURITY: HOW HARD IS IT FOR YOU TO PAY FOR THE VERY BASICS LIKE FOOD, HOUSING, MEDICAL CARE, AND HEATING?: NOT VERY HARD

## 2023-06-29 ASSESSMENT — PATIENT HEALTH QUESTIONNAIRE - PHQ9
SUM OF ALL RESPONSES TO PHQ QUESTIONS 1-9: 0
1. LITTLE INTEREST OR PLEASURE IN DOING THINGS: 0
1. LITTLE INTEREST OR PLEASURE IN DOING THINGS: NOT AT ALL
2. FEELING DOWN, DEPRESSED OR HOPELESS: NOT AT ALL
2. FEELING DOWN, DEPRESSED OR HOPELESS: 0
SUM OF ALL RESPONSES TO PHQ9 QUESTIONS 1 & 2: 0
SUM OF ALL RESPONSES TO PHQ QUESTIONS 1-9: 0
SUM OF ALL RESPONSES TO PHQ QUESTIONS 1-9: 0
SUM OF ALL RESPONSES TO PHQ9 QUESTIONS 1 & 2: 0
SUM OF ALL RESPONSES TO PHQ QUESTIONS 1-9: 0

## 2023-06-30 ENCOUNTER — OFFICE VISIT (OUTPATIENT)
Dept: FAMILY MEDICINE CLINIC | Age: 28
End: 2023-06-30
Payer: COMMERCIAL

## 2023-06-30 VITALS
BODY MASS INDEX: 34.49 KG/M2 | HEIGHT: 65 IN | WEIGHT: 207 LBS | SYSTOLIC BLOOD PRESSURE: 124 MMHG | TEMPERATURE: 96.9 F | DIASTOLIC BLOOD PRESSURE: 76 MMHG | OXYGEN SATURATION: 100 % | RESPIRATION RATE: 16 BRPM | HEART RATE: 92 BPM

## 2023-06-30 DIAGNOSIS — Z20.2 STD EXPOSURE: Primary | ICD-10-CM

## 2023-06-30 DIAGNOSIS — Z30.8 ENCOUNTER FOR OTHER CONTRACEPTIVE MANAGEMENT: ICD-10-CM

## 2023-06-30 DIAGNOSIS — N83.202 CYSTS OF BOTH OVARIES: ICD-10-CM

## 2023-06-30 DIAGNOSIS — D64.9 ANEMIA, UNSPECIFIED TYPE: ICD-10-CM

## 2023-06-30 DIAGNOSIS — N83.201 CYSTS OF BOTH OVARIES: ICD-10-CM

## 2023-06-30 DIAGNOSIS — Z20.2 STD EXPOSURE: ICD-10-CM

## 2023-06-30 LAB
ALBUMIN SERPL-MCNC: 4.1 G/DL (ref 3.5–4.6)
ALP SERPL-CCNC: 59 U/L (ref 40–130)
ALT SERPL-CCNC: 21 U/L (ref 0–33)
ANION GAP SERPL CALCULATED.3IONS-SCNC: 10 MEQ/L (ref 9–15)
AST SERPL-CCNC: 22 U/L (ref 0–35)
BASOPHILS # BLD: 0 K/UL (ref 0–0.2)
BASOPHILS NFR BLD: 0.5 %
BILIRUB SERPL-MCNC: 0.3 MG/DL (ref 0.2–0.7)
BUN SERPL-MCNC: 5 MG/DL (ref 6–20)
CALCIUM SERPL-MCNC: 9 MG/DL (ref 8.5–9.9)
CHLORIDE SERPL-SCNC: 103 MEQ/L (ref 95–107)
CO2 SERPL-SCNC: 23 MEQ/L (ref 20–31)
CREAT SERPL-MCNC: 0.68 MG/DL (ref 0.5–0.9)
EOSINOPHIL # BLD: 0.3 K/UL (ref 0–0.7)
EOSINOPHIL NFR BLD: 6.2 %
ERYTHROCYTE [DISTWIDTH] IN BLOOD BY AUTOMATED COUNT: 17.7 % (ref 11.5–14.5)
GLOBULIN SER CALC-MCNC: 3.1 G/DL (ref 2.3–3.5)
GLUCOSE SERPL-MCNC: 86 MG/DL (ref 70–99)
HCT VFR BLD AUTO: 35.8 % (ref 37–47)
HGB BLD-MCNC: 11.5 G/DL (ref 12–16)
LYMPHOCYTES # BLD: 1.8 K/UL (ref 1–4.8)
LYMPHOCYTES NFR BLD: 36.3 %
MCH RBC QN AUTO: 29.4 PG (ref 27–31.3)
MCHC RBC AUTO-ENTMCNC: 32.1 % (ref 33–37)
MCV RBC AUTO: 91.5 FL (ref 79.4–94.8)
MONOCYTES # BLD: 0.4 K/UL (ref 0.2–0.8)
MONOCYTES NFR BLD: 7.9 %
NEUTROPHILS # BLD: 2.5 K/UL (ref 1.4–6.5)
NEUTS SEG NFR BLD: 49.1 %
PLATELET # BLD AUTO: 271 K/UL (ref 130–400)
POTASSIUM SERPL-SCNC: 4.2 MEQ/L (ref 3.4–4.9)
PROT SERPL-MCNC: 7.2 G/DL (ref 6.3–8)
RBC # BLD AUTO: 3.92 M/UL (ref 4.2–5.4)
SODIUM SERPL-SCNC: 136 MEQ/L (ref 135–144)
WBC # BLD AUTO: 5.1 K/UL (ref 4.8–10.8)

## 2023-06-30 PROCEDURE — 99214 OFFICE O/P EST MOD 30 MIN: CPT | Performed by: PHYSICIAN ASSISTANT

## 2023-06-30 PROCEDURE — G8417 CALC BMI ABV UP PARAM F/U: HCPCS | Performed by: PHYSICIAN ASSISTANT

## 2023-06-30 PROCEDURE — G8427 DOCREV CUR MEDS BY ELIG CLIN: HCPCS | Performed by: PHYSICIAN ASSISTANT

## 2023-06-30 PROCEDURE — 4004F PT TOBACCO SCREEN RCVD TLK: CPT | Performed by: PHYSICIAN ASSISTANT

## 2023-06-30 RX ORDER — NORETHINDRONE ACETATE AND ETHINYL ESTRADIOL 1; .02 MG/1; MG/1
TABLET ORAL
COMMUNITY
Start: 2023-05-24 | End: 2023-06-30 | Stop reason: SDUPTHER

## 2023-06-30 RX ORDER — NORETHINDRONE ACETATE AND ETHINYL ESTRADIOL 1; .02 MG/1; MG/1
1 TABLET ORAL DAILY
Qty: 1 PACKET | Refills: 5 | Status: SHIPPED | OUTPATIENT
Start: 2023-06-30

## 2023-06-30 RX ORDER — LANOLIN ALCOHOL/MO/W.PET/CERES
325 CREAM (GRAM) TOPICAL 2 TIMES DAILY
Qty: 90 TABLET | Refills: 3 | Status: SHIPPED | OUTPATIENT
Start: 2023-06-30

## 2023-06-30 ASSESSMENT — ENCOUNTER SYMPTOMS
DIARRHEA: 0
VOMITING: 0
ABDOMINAL PAIN: 0
SINUS PRESSURE: 0
NAUSEA: 0
COUGH: 0
SHORTNESS OF BREATH: 0
CHEST TIGHTNESS: 0
BACK PAIN: 0
SORE THROAT: 0
SINUS PAIN: 0

## 2023-06-30 ASSESSMENT — VISUAL ACUITY: OU: 1

## 2023-07-06 LAB
C TRACH DNA UR QL NAA+PROBE: NEGATIVE
N GONORRHOEA DNA UR QL NAA+PROBE: NEGATIVE

## 2023-10-10 ENCOUNTER — APPOINTMENT (OUTPATIENT)
Dept: GENERAL RADIOLOGY | Age: 28
End: 2023-10-10
Payer: COMMERCIAL

## 2023-10-10 ENCOUNTER — HOSPITAL ENCOUNTER (EMERGENCY)
Age: 28
Discharge: HOME | End: 2023-10-10
Payer: COMMERCIAL

## 2023-10-10 VITALS
TEMPERATURE: 98 F | WEIGHT: 207 LBS | HEIGHT: 65 IN | DIASTOLIC BLOOD PRESSURE: 91 MMHG | BODY MASS INDEX: 34.49 KG/M2 | OXYGEN SATURATION: 98 % | RESPIRATION RATE: 18 BRPM | HEART RATE: 86 BPM | SYSTOLIC BLOOD PRESSURE: 135 MMHG

## 2023-10-10 DIAGNOSIS — S61.011A LACERATION OF RIGHT THUMB WITHOUT FOREIGN BODY WITHOUT DAMAGE TO NAIL, INITIAL ENCOUNTER: Primary | ICD-10-CM

## 2023-10-10 PROCEDURE — 73140 X-RAY EXAM OF FINGER(S): CPT

## 2023-10-10 PROCEDURE — 12001 RPR S/N/AX/GEN/TRNK 2.5CM/<: CPT

## 2023-10-10 PROCEDURE — 99283 EMERGENCY DEPT VISIT LOW MDM: CPT

## 2023-10-10 ASSESSMENT — PAIN DESCRIPTION - LOCATION: LOCATION: FINGER (COMMENT WHICH ONE)

## 2023-10-10 ASSESSMENT — ENCOUNTER SYMPTOMS
ABDOMINAL PAIN: 0
SHORTNESS OF BREATH: 0
BACK PAIN: 0
COUGH: 0

## 2023-10-10 ASSESSMENT — PATIENT HEALTH QUESTIONNAIRE - PHQ9
1. LITTLE INTEREST OR PLEASURE IN DOING THINGS: 0
2. FEELING DOWN, DEPRESSED OR HOPELESS: 0
SUM OF ALL RESPONSES TO PHQ QUESTIONS 1-9: 0
SUM OF ALL RESPONSES TO PHQ9 QUESTIONS 1 & 2: 0

## 2023-10-10 ASSESSMENT — PAIN - FUNCTIONAL ASSESSMENT
PAIN_FUNCTIONAL_ASSESSMENT: PREVENTS OR INTERFERES WITH MANY ACTIVE NOT PASSIVE ACTIVITIES
PAIN_FUNCTIONAL_ASSESSMENT: 0-10

## 2023-10-10 ASSESSMENT — PAIN DESCRIPTION - FREQUENCY: FREQUENCY: CONTINUOUS

## 2023-10-10 ASSESSMENT — PAIN DESCRIPTION - DESCRIPTORS: DESCRIPTORS: ACHING

## 2023-10-10 ASSESSMENT — LIFESTYLE VARIABLES
HOW MANY STANDARD DRINKS CONTAINING ALCOHOL DO YOU HAVE ON A TYPICAL DAY: PATIENT DOES NOT DRINK
HOW OFTEN DO YOU HAVE A DRINK CONTAINING ALCOHOL: NEVER

## 2023-10-10 ASSESSMENT — PAIN DESCRIPTION - ORIENTATION: ORIENTATION: RIGHT

## 2023-10-10 ASSESSMENT — PAIN DESCRIPTION - ONSET: ONSET: ON-GOING

## 2023-10-10 ASSESSMENT — PAIN DESCRIPTION - PAIN TYPE: TYPE: ACUTE PAIN

## 2023-10-11 NOTE — ED PROVIDER NOTES
[facility] ED  eMERGENCY dEPARTMENT eNCOUnter      Pt Name: Tamy Hankins  MRN: [id number]  [address] [facility] [place] 1995  Date of evaluation: 10/10/2023  Provider: SRINATH Patel CNP      HISTORY OF PRESENT ILLNESS    Tamy Hankins is a 34 y.o. female who presents to the Emergency Department with R thumb laceration that occurred PTA. Patient cut finger on glass. Bleeding is stopped. Pain is mild. REVIEW OF SYSTEMS       Review of Systems   Constitutional:  Negative for activity change, appetite change and fever. HENT:  Negative for congestion. Respiratory:  Negative for cough and shortness of breath. Cardiovascular:  Negative for chest pain. Gastrointestinal:  Negative for abdominal pain. Genitourinary:  Negative for dysuria. Musculoskeletal:  Negative for arthralgias and back pain. Skin:  Positive for wound (R thumb laceration). Negative for rash. All other systems reviewed and are negative. PAST MEDICAL HISTORY     Past Medical History:   Diagnosis Date    Anemia     Asthma 2018         SURGICAL HISTORY       Past Surgical History:   Procedure Laterality Date     SECTION N/A 2017     SE[place] [facility] [place]     SECTION N/A 2020     SECTION performed by Zamzam Pelayo [place] [facility]    TONSILLECTOMY AND ADENOIDECTOMY           CURRENT MEDICATIONS       Previous Medications    DROSPIRENONE-ETHINYL ESTRADIOL (DAI) 3-0.02 MG PER TABLET    Take 1 tablet by mouth daily    FERROUS SULFATE (FE TABS) 325 (65 FE) MG EC TABLET    Take 1 tablet by mouth 2 times daily    NICOTINE (NICODERM CQ) 14 MG/24HR    Place 1 patch onto the skin every 24 hours    NORETHINDRONE-ETHINYL ESTRADIOL (MICROGESTIN 20) 1-20 MG-MCG PER TABLET    Take 1 tablet by mouth daily       ALLERGIES     Patient has no known allergies.     FAMILY HISTORY       Family History   Problem Relation Age of Onset    Breast

## 2024-04-01 NOTE — PROGRESS NOTES
Subjective  Melly Rainey 1995 is a 28 y.o. female who presents today with:  Chief Complaint   Patient presents with    Abdominal Pain     Lower abdomen on both side; has gotten worse over the last year; imaging from 2022 found cysts on ovaries        Abdominal Pain  This is a new problem. The current episode started more than 1 year ago. The onset quality is gradual. The problem occurs intermittently. The problem has been unchanged. The pain is located in the suprapubic region. The pain is mild. The quality of the pain is aching and sharp. The abdominal pain does not radiate. Pertinent negatives include no anorexia, arthralgias, belching, constipation, diarrhea, dysuria, fever, flatus, frequency, headaches, hematochezia, hematuria, melena, myalgias, nausea or vomiting. Exacerbated by: menses. The pain is relieved by Nothing. Prior diagnostic workup includes ultrasound (ovarian cyst). Her past medical history is significant for abdominal surgery (x2 c-sections).     - patinet concerned that pain is contributing to her inability to get pregnant. Having normal menses. Partner has not been tested.     Review of Systems   Constitutional:  Negative for activity change, appetite change, chills and fever.   HENT:  Negative for congestion, drooling, sinus pressure, sinus pain and sore throat.    Eyes:  Negative for visual disturbance.   Respiratory:  Negative for cough, chest tightness and shortness of breath.    Cardiovascular:  Negative for chest pain.   Gastrointestinal:  Positive for abdominal pain. Negative for anorexia, constipation, diarrhea, flatus, hematochezia, melena, nausea and vomiting.   Endocrine: Negative for cold intolerance.   Genitourinary:  Negative for dysuria, flank pain, frequency and hematuria.   Musculoskeletal:  Negative for arthralgias, back pain and myalgias.   Skin:  Negative for rash.   Allergic/Immunologic: Negative for food allergies.   Neurological:  Negative for weakness,

## 2024-04-03 ENCOUNTER — OFFICE VISIT (OUTPATIENT)
Dept: FAMILY MEDICINE CLINIC | Age: 29
End: 2024-04-03
Payer: COMMERCIAL

## 2024-04-03 VITALS
DIASTOLIC BLOOD PRESSURE: 82 MMHG | BODY MASS INDEX: 33.36 KG/M2 | WEIGHT: 200.2 LBS | RESPIRATION RATE: 16 BRPM | HEIGHT: 65 IN | HEART RATE: 94 BPM | TEMPERATURE: 96.5 F | SYSTOLIC BLOOD PRESSURE: 134 MMHG | OXYGEN SATURATION: 100 %

## 2024-04-03 DIAGNOSIS — R10.32 LEFT LOWER QUADRANT ABDOMINAL PAIN: ICD-10-CM

## 2024-04-03 DIAGNOSIS — N83.202 CYSTS OF BOTH OVARIES: Primary | ICD-10-CM

## 2024-04-03 DIAGNOSIS — N83.201 CYSTS OF BOTH OVARIES: Primary | ICD-10-CM

## 2024-04-03 LAB
BILIRUBIN, POC: NORMAL
BLOOD URINE, POC: NORMAL
CLARITY, POC: NORMAL
COLOR, POC: YELLOW
CONTROL: NORMAL
GLUCOSE URINE, POC: NORMAL
KETONES, POC: NORMAL
LEUKOCYTE EST, POC: NORMAL
NITRITE, POC: NORMAL
PH, POC: 6.5
PREGNANCY TEST URINE, POC: NEGATIVE
PROTEIN, POC: NORMAL
SPECIFIC GRAVITY, POC: 1.01
UROBILINOGEN, POC: NORMAL

## 2024-04-03 PROCEDURE — G8417 CALC BMI ABV UP PARAM F/U: HCPCS | Performed by: PHYSICIAN ASSISTANT

## 2024-04-03 PROCEDURE — 81025 URINE PREGNANCY TEST: CPT | Performed by: PHYSICIAN ASSISTANT

## 2024-04-03 PROCEDURE — 99213 OFFICE O/P EST LOW 20 MIN: CPT | Performed by: PHYSICIAN ASSISTANT

## 2024-04-03 PROCEDURE — G8427 DOCREV CUR MEDS BY ELIG CLIN: HCPCS | Performed by: PHYSICIAN ASSISTANT

## 2024-04-03 PROCEDURE — 81002 URINALYSIS NONAUTO W/O SCOPE: CPT | Performed by: PHYSICIAN ASSISTANT

## 2024-04-03 PROCEDURE — 4004F PT TOBACCO SCREEN RCVD TLK: CPT | Performed by: PHYSICIAN ASSISTANT

## 2024-04-03 ASSESSMENT — ENCOUNTER SYMPTOMS
SINUS PRESSURE: 0
COUGH: 0
SORE THROAT: 0
SINUS PAIN: 0
NAUSEA: 0
CHEST TIGHTNESS: 0
BELCHING: 0
ABDOMINAL PAIN: 1
HEMATOCHEZIA: 0
BACK PAIN: 0
SHORTNESS OF BREATH: 0
CONSTIPATION: 0
FLATUS: 0
VOMITING: 0
DIARRHEA: 0

## 2024-04-03 ASSESSMENT — PATIENT HEALTH QUESTIONNAIRE - PHQ9
10. IF YOU CHECKED OFF ANY PROBLEMS, HOW DIFFICULT HAVE THESE PROBLEMS MADE IT FOR YOU TO DO YOUR WORK, TAKE CARE OF THINGS AT HOME, OR GET ALONG WITH OTHER PEOPLE: NOT DIFFICULT AT ALL
SUM OF ALL RESPONSES TO PHQ QUESTIONS 1-9: 0
4. FEELING TIRED OR HAVING LITTLE ENERGY: NOT AT ALL
SUM OF ALL RESPONSES TO PHQ9 QUESTIONS 1 & 2: 0
7. TROUBLE CONCENTRATING ON THINGS, SUCH AS READING THE NEWSPAPER OR WATCHING TELEVISION: NOT AT ALL
8. MOVING OR SPEAKING SO SLOWLY THAT OTHER PEOPLE COULD HAVE NOTICED. OR THE OPPOSITE, BEING SO FIGETY OR RESTLESS THAT YOU HAVE BEEN MOVING AROUND A LOT MORE THAN USUAL: NOT AT ALL
SUM OF ALL RESPONSES TO PHQ QUESTIONS 1-9: 0
1. LITTLE INTEREST OR PLEASURE IN DOING THINGS: NOT AT ALL
SUM OF ALL RESPONSES TO PHQ QUESTIONS 1-9: 0
SUM OF ALL RESPONSES TO PHQ QUESTIONS 1-9: 0
5. POOR APPETITE OR OVEREATING: NOT AT ALL
2. FEELING DOWN, DEPRESSED OR HOPELESS: NOT AT ALL
3. TROUBLE FALLING OR STAYING ASLEEP: NOT AT ALL
6. FEELING BAD ABOUT YOURSELF - OR THAT YOU ARE A FAILURE OR HAVE LET YOURSELF OR YOUR FAMILY DOWN: NOT AT ALL
9. THOUGHTS THAT YOU WOULD BE BETTER OFF DEAD, OR OF HURTING YOURSELF: NOT AT ALL

## 2024-04-03 ASSESSMENT — VISUAL ACUITY: OU: 1

## 2024-04-09 ENCOUNTER — HOSPITAL ENCOUNTER (OUTPATIENT)
Dept: ULTRASOUND IMAGING | Age: 29
Discharge: HOME | End: 2024-04-11
Payer: COMMERCIAL

## 2024-04-09 DIAGNOSIS — N83.201 CYSTS OF BOTH OVARIES: ICD-10-CM

## 2024-04-09 DIAGNOSIS — N83.202 CYSTS OF BOTH OVARIES: ICD-10-CM

## 2024-04-09 PROCEDURE — 93975 VASCULAR STUDY: CPT

## 2024-04-09 PROCEDURE — 76856 US EXAM PELVIC COMPLETE: CPT

## 2024-07-23 NOTE — PROGRESS NOTES
Well Adult Note  Name: Melly Rainey Today’s Date: 2024   MRN: [id number] Sex: Female   Age: 28 y.o. Ethnicity: Non- / Non    : 1995 Race: Black /       Melly Rainey is here for a well adult exam and employment physical  Chief Complaint   Patient presents with    Employment Physical     Pt here for physical to attend school           Subjective   History:  Asthma  - On  prn albuterol - less than 3 times a week. No significant cough, mild baseline shortness of breath, occasional wheezing, mild, occasional chest tightness.     Here for physical for nursing school. Patient has no other complaints. She will need Tdap vaccination and complete quantiferon gold test for TB.    Smoking  - about 0.5 a day. Working on cutting down. Patient understands benefits of smoking cessation. Previously on nicotine patches. Does not want any refills    Review of Systems   Constitutional:  Negative for activity change, appetite change, chills and fever.   HENT:  Negative for congestion, drooling, sinus pressure, sinus pain and sore throat.    Eyes:  Negative for visual disturbance.   Respiratory:  Negative for cough, chest tightness and shortness of breath.    Cardiovascular:  Negative for chest pain.   Gastrointestinal:  Negative for abdominal pain, diarrhea, nausea and vomiting.   Endocrine: Negative for cold intolerance.   Genitourinary:  Negative for dysuria, flank pain, frequency and hematuria.   Musculoskeletal:  Negative for arthralgias and back pain.   Skin:  Negative for rash.   Allergic/Immunologic: Negative for food allergies.   Neurological:  Negative for weakness, light-headedness, numbness and headaches.   Hematological:  Does not bruise/bleed easily.       No Known Allergies  Prior to Visit Medications    Medication Sig Taking? Authorizing Provider   Tetanus-Diphth-Acell Pertussis (BOOSTRIX) 5-2.5-18.5 LF-MCG/0.5 injection Inject 0.5 mLs into the muscle once for 1 dose Yes

## 2024-07-25 ENCOUNTER — OFFICE VISIT (OUTPATIENT)
Dept: FAMILY MEDICINE CLINIC | Age: 29
End: 2024-07-25

## 2024-07-25 VITALS
HEART RATE: 87 BPM | SYSTOLIC BLOOD PRESSURE: 126 MMHG | DIASTOLIC BLOOD PRESSURE: 80 MMHG | RESPIRATION RATE: 16 BRPM | BODY MASS INDEX: 34.62 KG/M2 | OXYGEN SATURATION: 100 % | HEIGHT: 65 IN | WEIGHT: 207.8 LBS | TEMPERATURE: 97.2 F

## 2024-07-25 DIAGNOSIS — Z87.891 PERSONAL HISTORY OF TOBACCO USE, PRESENTING HAZARDS TO HEALTH: ICD-10-CM

## 2024-07-25 DIAGNOSIS — Z00.00 ENCOUNTER FOR WELL ADULT EXAM WITHOUT ABNORMAL FINDINGS: ICD-10-CM

## 2024-07-25 DIAGNOSIS — Z11.1 TUBERCULOSIS SCREENING: Primary | ICD-10-CM

## 2024-07-25 DIAGNOSIS — J45.20 MILD INTERMITTENT ASTHMA WITHOUT COMPLICATION: ICD-10-CM

## 2024-07-25 DIAGNOSIS — Z23 NEED FOR TDAP VACCINATION: ICD-10-CM

## 2024-07-25 ASSESSMENT — ENCOUNTER SYMPTOMS
SINUS PAIN: 0
SHORTNESS OF BREATH: 0
COUGH: 0
CHEST TIGHTNESS: 0
BACK PAIN: 0
SINUS PRESSURE: 0
NAUSEA: 0
DIARRHEA: 0
VOMITING: 0
ABDOMINAL PAIN: 0
SORE THROAT: 0

## 2024-07-25 ASSESSMENT — VISUAL ACUITY: OU: 1

## 2024-07-25 NOTE — PATIENT INSTRUCTIONS
a medical condition or this instruction, always ask your healthcare professional. Healthwise, [facility] disclaims any warranty or liability for your use of this information.           Deciding About Using Medicines To Quit Smoking  How can you decide about using medicines to quit smoking?  What are the medicines you can use?  Your doctor may prescribe varenicline (Chantix) or bupropion SR. These medicines can help you cope with cravings for tobacco. They are pills that don't contain nicotine.  You also can use nicotine replacement products. These do contain nicotine. There are many types.  Gum and lozenges slowly release nicotine into your mouth.  Patches stick to your skin. They slowly release nicotine into your bloodstream.  An inhaler has a winters that contains nicotine. You breathe in a puff of nicotine vapor through your mouth and throat.  Nasal spray releases a mist that contains nicotine.  What are key points about this decision?  Using medicines can increase your chances of quitting smoking. They can ease cravings and withdrawal symptoms.  Getting counseling along with using medicine can raise your chances of quitting even more.  These nicotine replacement products have less nicotine than cigarettes. And by itself, nicotine is not nearly as harmful as smoking. The tars, carbon monoxide, and other toxic chemicals in tobacco cause the harmful effects.  The side effects of nicotine replacement products depend on the type of product. For example, a patch can make your skin red and itchy. Medicines in pill form can make you sick to your stomach. They can also cause dry mouth and trouble sleeping. For most people, the side effects are not bad enough to make them stop using the products.  Why might you choose to use medicines to quit smoking?  You have tried on your own to stop smoking, but you were not able to stop.  You want to increase your chances of quitting smoking.  You want to reduce your cravings and

## 2024-07-26 DIAGNOSIS — Z11.1 TUBERCULOSIS SCREENING: ICD-10-CM

## 2024-07-29 LAB
GAMMA INTERFERON BACKGROUND BLD IA-ACNC: 0.04 IU/ML
M TB IFN-G BLD-IMP: NEGATIVE
M TB IFN-G CD4+ BCKGRND COR BLD-ACNC: 0.02 IU/ML
M TB IFN-G CD4+CD8+ BCKGRND COR BLD-ACNC: 0 IU/ML
MITOGEN IGNF BCKGRD COR BLD-ACNC: 9.96 IU/ML

## 2024-12-19 ENCOUNTER — OFFICE VISIT (OUTPATIENT)
Dept: OBGYN CLINIC | Age: 29
End: 2024-12-19
Payer: COMMERCIAL

## 2024-12-19 VITALS
SYSTOLIC BLOOD PRESSURE: 124 MMHG | HEART RATE: 107 BPM | BODY MASS INDEX: 36.99 KG/M2 | HEIGHT: 65 IN | WEIGHT: 222 LBS | DIASTOLIC BLOOD PRESSURE: 74 MMHG

## 2024-12-19 DIAGNOSIS — N91.2 AMENORRHEA: ICD-10-CM

## 2024-12-19 DIAGNOSIS — Z11.51 SCREENING FOR HPV (HUMAN PAPILLOMAVIRUS): ICD-10-CM

## 2024-12-19 DIAGNOSIS — N91.2 AMENORRHEA: Primary | ICD-10-CM

## 2024-12-19 DIAGNOSIS — Z13.1 SCREENING FOR DIABETES MELLITUS: ICD-10-CM

## 2024-12-19 LAB
AMPHET UR QL SCN: ABNORMAL
BARBITURATES UR QL SCN: ABNORMAL
BENZODIAZ UR QL SCN: ABNORMAL
BILIRUB UR QL STRIP: ABNORMAL
CANNABINOIDS UR QL SCN: POSITIVE
CLARITY UR: ABNORMAL
COCAINE UR QL SCN: ABNORMAL
COLOR UR: ABNORMAL
DRUG SCREEN COMMENT UR-IMP: ABNORMAL
FENTANYL SCREEN, URINE: ABNORMAL
GLUCOSE UR STRIP-MCNC: NEGATIVE MG/DL
HGB UR QL STRIP: NEGATIVE
KETONES UR STRIP-MCNC: 40 MG/DL
LEUKOCYTE ESTERASE UR QL STRIP: NEGATIVE
METHADONE UR QL SCN: ABNORMAL
NITRITE UR QL STRIP: NEGATIVE
OPIATES UR QL SCN: ABNORMAL
OXYCODONE UR QL SCN: ABNORMAL
PCP UR QL SCN: ABNORMAL
PH UR STRIP: 5.5 [PH] (ref 5–9)
PROPOXYPH UR QL SCN: ABNORMAL
PROT UR STRIP-MCNC: ABNORMAL MG/DL
SP GR UR STRIP: 1.03 (ref 1–1.03)
UROBILINOGEN UR STRIP-ACNC: 1 E.U./DL

## 2024-12-19 PROCEDURE — G8417 CALC BMI ABV UP PARAM F/U: HCPCS | Performed by: OBSTETRICS & GYNECOLOGY

## 2024-12-19 PROCEDURE — G8484 FLU IMMUNIZE NO ADMIN: HCPCS | Performed by: OBSTETRICS & GYNECOLOGY

## 2024-12-19 PROCEDURE — G8427 DOCREV CUR MEDS BY ELIG CLIN: HCPCS | Performed by: OBSTETRICS & GYNECOLOGY

## 2024-12-19 PROCEDURE — 99214 OFFICE O/P EST MOD 30 MIN: CPT | Performed by: OBSTETRICS & GYNECOLOGY

## 2024-12-19 PROCEDURE — 4004F PT TOBACCO SCREEN RCVD TLK: CPT | Performed by: OBSTETRICS & GYNECOLOGY

## 2024-12-19 PROCEDURE — 76817 TRANSVAGINAL US OBSTETRIC: CPT | Performed by: OBSTETRICS & GYNECOLOGY

## 2024-12-19 RX ORDER — VITAMIN A, VITAMIN C, VITAMIN D-3, VITAMIN E, VITAMIN B-1, VITAMIN B-2, NIACIN, VITAMIN B-6, CALCIUM, IRON, ZINC, COPPER 4000; 120; 400; 22; 1.84; 3; 20; 10; 1; 12; 200; 27; 25; 2 [IU]/1; MG/1; [IU]/1; MG/1; MG/1; MG/1; MG/1; MG/1; MG/1; UG/1; MG/1; MG/1; MG/1; MG/1
1 TABLET ORAL DAILY
Qty: 90 TABLET | Refills: 3 | Status: SHIPPED | OUTPATIENT
Start: 2024-12-19

## 2024-12-19 ASSESSMENT — ENCOUNTER SYMPTOMS
VOICE CHANGE: 0
COLOR CHANGE: 0
ABDOMINAL DISTENTION: 0
NAUSEA: 0
CONSTIPATION: 0
CHEST TIGHTNESS: 0
WHEEZING: 0
BACK PAIN: 0
TROUBLE SWALLOWING: 0
ABDOMINAL PAIN: 0
VOMITING: 0
COUGH: 0
BLOOD IN STOOL: 0
SHORTNESS OF BREATH: 0
SORE THROAT: 0

## 2024-12-19 NOTE — PROGRESS NOTES
HPI:  Melly Rainey (: 1995) is a 29 y.o. female, Established patient, here for evaluation of the following chief complaint(s):  Amenorrhea    Patient is here for an amenorrhea visit.  29-year-old  3 para 2-0-0-2.  2 prior  sections.  Complains of nausea.  No change in her medical or surgical history  SUBJECTIVE/OBJECTIVE:    Past Surgical History:   Procedure Laterality Date     SECTION N/A 2017     SECTION performed by Saul Monzon MD at [facility]     SECTION N/A 2020     SECTION performed by Saul Keenan DO at [facility]    TONSILLECTOMY AND ADENOIDECTOMY          Review of Systems   Constitutional:  Negative for activity change, appetite change, fatigue and unexpected weight change.   HENT:  Negative for dental problem, ear pain, hearing loss, nosebleeds, sore throat, trouble swallowing and voice change.    Eyes:  Negative for visual disturbance.   Respiratory:  Negative for cough, chest tightness, shortness of breath and wheezing.    Cardiovascular:  Negative for chest pain and palpitations.   Gastrointestinal:  Negative for abdominal distention, abdominal pain, blood in stool, constipation, nausea and vomiting.   Endocrine: Negative for cold intolerance, heat intolerance, polydipsia, polyphagia and polyuria.   Genitourinary:  Negative for difficulty urinating, dyspareunia, dysuria, flank pain, frequency, genital sores, hematuria, menstrual problem, pelvic pain, urgency, vaginal bleeding, vaginal discharge and vaginal pain.   Musculoskeletal:  Negative for arthralgias, back pain, joint swelling and myalgias.   Skin:  Negative for color change and rash.   Allergic/Immunologic: Negative for environmental allergies, food allergies and immunocompromised state.   Neurological:  Negative for dizziness, seizures, syncope, speech difficulty, weakness, numbness and headaches.   Hematological:  Negative for adenopathy. Does not

## 2024-12-20 ENCOUNTER — TELEPHONE (OUTPATIENT)
Dept: OBGYN CLINIC | Age: 29
End: 2024-12-20

## 2024-12-20 LAB
BACTERIA UR CULT: NORMAL
CLUE CELLS VAG QL WET PREP: NORMAL
T VAGINALIS VAG QL WET PREP: NORMAL
TRICHOMONAS VAGINALIS SCREEN: NEGATIVE
YEAST VAG QL WET PREP: NORMAL

## 2024-12-20 RX ORDER — ONDANSETRON 8 MG/1
8 TABLET, FILM COATED ORAL EVERY 8 HOURS PRN
Qty: 90 TABLET | Refills: 1 | Status: SHIPPED | OUTPATIENT
Start: 2024-12-20

## 2024-12-20 NOTE — TELEPHONE ENCOUNTER
Pt called, stated saw provider yesterday and was told for their nausea that zofran would be called into their pharmacy but when they went to pick it up, it was not there. [facility] [place]. Please advise and send if appropriate.

## 2024-12-24 LAB
C TRACH DNA CVX QL NAA+PROBE: NEGATIVE
HPV HR 12 DNA SPEC QL NAA+PROBE: NOT DETECTED
HPV16 DNA SPEC QL NAA+PROBE: NOT DETECTED
HPV16+18+H RISK 12 DNA SPEC-IMP: NORMAL
HPV18 DNA SPEC QL NAA+PROBE: NOT DETECTED
N GONORRHOEA DNA SPEC QL NAA+PROBE: NEGATIVE

## 2024-12-28 ENCOUNTER — PATIENT MESSAGE (OUTPATIENT)
Dept: OBGYN CLINIC | Age: 29
End: 2024-12-28

## 2024-12-28 DIAGNOSIS — Z3A.12 12 WEEKS GESTATION OF PREGNANCY: Primary | ICD-10-CM

## 2025-01-13 ENCOUNTER — HOSPITAL ENCOUNTER (OUTPATIENT)
Dept: ULTRASOUND IMAGING | Age: 30
Discharge: HOME | End: 2025-01-15
Payer: COMMERCIAL

## 2025-01-13 DIAGNOSIS — Z13.1 SCREENING FOR DIABETES MELLITUS: ICD-10-CM

## 2025-01-13 DIAGNOSIS — N91.2 AMENORRHEA: ICD-10-CM

## 2025-01-13 LAB
BASOPHILS # BLD: 0 K/UL (ref 0–0.2)
BASOPHILS NFR BLD: 0.3 %
EOSINOPHIL # BLD: 0.2 K/UL (ref 0–0.7)
EOSINOPHIL NFR BLD: 3.8 %
ERYTHROCYTE [DISTWIDTH] IN BLOOD BY AUTOMATED COUNT: 13 % (ref 11.5–14.5)
GONADOTROPIN, CHORIONIC (HCG) QUANT: NORMAL MIU/ML
HBV SURFACE AG SERPL QL IA: NORMAL
HCT VFR BLD AUTO: 36.2 % (ref 37–47)
HGB BLD-MCNC: 12.8 G/DL (ref 12–16)
LYMPHOCYTES # BLD: 2.5 K/UL (ref 1–4.8)
LYMPHOCYTES NFR BLD: 41.1 %
MCH RBC QN AUTO: 32.9 PG (ref 27–31.3)
MCHC RBC AUTO-ENTMCNC: 35.4 % (ref 33–37)
MCV RBC AUTO: 93.1 FL (ref 79.4–94.8)
MONOCYTES # BLD: 0.4 K/UL (ref 0.2–0.8)
MONOCYTES NFR BLD: 6 %
NEUTROPHILS # BLD: 2.9 K/UL (ref 1.4–6.5)
NEUTS SEG NFR BLD: 48.6 %
PLATELET # BLD AUTO: 267 K/UL (ref 130–400)
RBC # BLD AUTO: 3.89 M/UL (ref 4.2–5.4)
REAGIN+T PALLIDUM IGG+IGM SERPL-IMP: NORMAL
RUBELLA ANTIBODY IGG: 105.3 IU/ML
TSH SERPL-MCNC: 1.31 UIU/ML (ref 0.44–3.86)
WBC # BLD AUTO: 6 K/UL (ref 4.8–10.8)

## 2025-01-13 PROCEDURE — 76817 TRANSVAGINAL US OBSTETRIC: CPT

## 2025-01-13 PROCEDURE — 76801 OB US < 14 WKS SINGLE FETUS: CPT

## 2025-01-14 ENCOUNTER — INITIAL PRENATAL (OUTPATIENT)
Dept: OBGYN CLINIC | Age: 30
End: 2025-01-14
Payer: COMMERCIAL

## 2025-01-14 VITALS
HEART RATE: 100 BPM | DIASTOLIC BLOOD PRESSURE: 64 MMHG | BODY MASS INDEX: 36.94 KG/M2 | SYSTOLIC BLOOD PRESSURE: 110 MMHG | WEIGHT: 222 LBS

## 2025-01-14 DIAGNOSIS — Z3A.11 11 WEEKS GESTATION OF PREGNANCY: ICD-10-CM

## 2025-01-14 DIAGNOSIS — G44.209 TENSION HEADACHE: ICD-10-CM

## 2025-01-14 DIAGNOSIS — Z34.91 PRENATAL CARE, FIRST TRIMESTER: Primary | ICD-10-CM

## 2025-01-14 LAB
ABO/RH: NORMAL
ANTIBODY SCREEN: NORMAL
ESTIMATED AVERAGE GLUCOSE: 94 MG/DL
FERRITIN: 46 NG/ML (ref 15–150)
HBA1C MFR BLD: 4.9 % (ref 4–6)
HEPATITIS C ANTIBODY: NONREACTIVE
HIV AG/AB: NONREACTIVE
SICKLE CELL SCREEN: NEGATIVE

## 2025-01-14 PROCEDURE — G8427 DOCREV CUR MEDS BY ELIG CLIN: HCPCS | Performed by: OBSTETRICS & GYNECOLOGY

## 2025-01-14 PROCEDURE — 4004F PT TOBACCO SCREEN RCVD TLK: CPT | Performed by: OBSTETRICS & GYNECOLOGY

## 2025-01-14 PROCEDURE — 99213 OFFICE O/P EST LOW 20 MIN: CPT | Performed by: OBSTETRICS & GYNECOLOGY

## 2025-01-14 PROCEDURE — G8417 CALC BMI ABV UP PARAM F/U: HCPCS | Performed by: OBSTETRICS & GYNECOLOGY

## 2025-01-14 RX ORDER — BUTALBITAL, ACETAMINOPHEN AND CAFFEINE 50; 325; 40 MG/1; MG/1; MG/1
1 TABLET ORAL EVERY 4 HOURS PRN
Qty: 180 TABLET | Refills: 1 | Status: SHIPPED | OUTPATIENT
Start: 2025-01-14

## 2025-01-14 SDOH — ECONOMIC STABILITY: FOOD INSECURITY: WITHIN THE PAST 12 MONTHS, THE FOOD YOU BOUGHT JUST DIDN'T LAST AND YOU DIDN'T HAVE MONEY TO GET MORE.: NEVER TRUE

## 2025-01-14 SDOH — ECONOMIC STABILITY: FOOD INSECURITY: WITHIN THE PAST 12 MONTHS, YOU WORRIED THAT YOUR FOOD WOULD RUN OUT BEFORE YOU GOT MONEY TO BUY MORE.: NEVER TRUE

## 2025-01-14 ASSESSMENT — PATIENT HEALTH QUESTIONNAIRE - PHQ9
1. LITTLE INTEREST OR PLEASURE IN DOING THINGS: NOT AT ALL
SUM OF ALL RESPONSES TO PHQ QUESTIONS 1-9: 0
SUM OF ALL RESPONSES TO PHQ QUESTIONS 1-9: 0
SUM OF ALL RESPONSES TO PHQ9 QUESTIONS 1 & 2: 0
SUM OF ALL RESPONSES TO PHQ QUESTIONS 1-9: 0
SUM OF ALL RESPONSES TO PHQ QUESTIONS 1-9: 0
2. FEELING DOWN, DEPRESSED OR HOPELESS: NOT AT ALL

## 2025-01-14 NOTE — PROGRESS NOTES
Patient's last menstrual period was 10/24/2024 (exact date).  Please reference prenatal and OB flow chart for further information  PT here today for routine prenatal care  Pt endorses fetal movement and denies loss of fluid, contractions or vaginal bleeding  Patient complaining of tension headaches.  Otherwise doing well.  ROS:  Pt denies headache, vision changes, right upper quadrant pain, dysuria, or nausea/vomiting,     PE:  /64   Pulse 100   Wt 100.7 kg (222 lb)   LMP 10/24/2024 (Exact Date)   BMI 36.94 kg/m²   Gen - Alert and oriented x 3  HEENT- NC/AT, CVS - RRR, Lungs - CTAB  Abd - FH    by transvaginal ultrasound.      ASSESSMENT AND PLAN:   IUP at 11 weeks and 5 days  Previous  section x 2  Tension headaches  Patient to return in 4 weeks  Fioricet for headache

## 2025-01-16 LAB — VZV IGG SER IA-ACNC: 7.2 S/CO

## 2025-01-21 LAB
Lab: NORMAL
NTRA FETAL FRACTION: NORMAL
NTRA GENDER OF FETUS: NORMAL
NTRA MONOSOMY X AGE-BASED RISK TEXT: NORMAL
NTRA MONOSOMY X RESULT TEXT: NORMAL
NTRA MONOSOMY X RISK SCORE TEXT: NORMAL
NTRA TRIPLOIDY RESULT TEXT: NORMAL
NTRA TRISOMY 13 AGE-BASED RISK TEXT: NORMAL
NTRA TRISOMY 13 RESULT TEXT: NORMAL
NTRA TRISOMY 13 RISK SCORE TEXT: NORMAL
NTRA TRISOMY 18 AGE-BASED RISK TEXT: NORMAL
NTRA TRISOMY 18 RESULT TEXT: NORMAL
NTRA TRISOMY 18 RISK SCORE TEXT: NORMAL
NTRA TRISOMY 21 AGE-BASED RISK TEXT: NORMAL
NTRA TRISOMY 21 RESULT TEXT: NORMAL
NTRA TRISOMY 21 RISK SCORE TEXT: NORMAL

## 2025-01-24 PROBLEM — L85.1 ACQUIRED PLANTAR POROKERATOSIS: Status: ACTIVE | Noted: 2023-08-04

## 2025-02-13 ENCOUNTER — ROUTINE PRENATAL (OUTPATIENT)
Dept: OBGYN CLINIC | Age: 30
End: 2025-02-13

## 2025-02-13 VITALS
DIASTOLIC BLOOD PRESSURE: 64 MMHG | HEART RATE: 100 BPM | BODY MASS INDEX: 36.28 KG/M2 | SYSTOLIC BLOOD PRESSURE: 124 MMHG | WEIGHT: 218 LBS

## 2025-02-13 DIAGNOSIS — Z3A.16 16 WEEKS GESTATION OF PREGNANCY: ICD-10-CM

## 2025-02-13 DIAGNOSIS — Z34.92 PRENATAL CARE, SECOND TRIMESTER: Primary | ICD-10-CM

## 2025-02-13 NOTE — PROGRESS NOTES
Patient's last menstrual period was 10/24/2024 (exact date).  Please reference prenatal and OB flow chart for further information  PT here today for routine prenatal care  Pt endorses fetal movement and denies loss of fluid, contractions or vaginal bleeding  Patient has no complaints.  No fetal movement yet.  16 weeks.  2 previous  sections.  Panorama was normal.  Labs are normal.  ROS:  Pt denies headache, vision changes, right upper quadrant pain, dysuria, or nausea/vomiting,     PE:  /64   Pulse 100   Wt 98.9 kg (218 lb)   LMP 10/24/2024 (Exact Date)   BMI 36.28 kg/m²   Gen - Alert and oriented x 3  HEENT- NC/AT, CVS - RRR, Lungs - CTAB  Abd - FH         ASSESSMENT AND PLAN:   IUP at 16 weeks  Patient to return in 4 weeks  Ultrasound at 20 weeks for anatomy  2 previous  sections for repeat

## 2025-02-24 ENCOUNTER — TELEPHONE (OUTPATIENT)
Dept: OBGYN CLINIC | Age: 30
End: 2025-02-24

## 2025-03-12 ENCOUNTER — HOSPITAL ENCOUNTER (OUTPATIENT)
Dept: ULTRASOUND IMAGING | Age: 30
Discharge: HOME | End: 2025-03-14
Payer: COMMERCIAL

## 2025-03-12 DIAGNOSIS — Z34.92 PRENATAL CARE, SECOND TRIMESTER: ICD-10-CM

## 2025-03-12 PROCEDURE — 76817 TRANSVAGINAL US OBSTETRIC: CPT

## 2025-03-12 PROCEDURE — 76805 OB US >/= 14 WKS SNGL FETUS: CPT

## 2025-03-18 ENCOUNTER — ROUTINE PRENATAL (OUTPATIENT)
Dept: OBGYN CLINIC | Age: 30
End: 2025-03-18
Payer: COMMERCIAL

## 2025-03-18 VITALS
SYSTOLIC BLOOD PRESSURE: 120 MMHG | BODY MASS INDEX: 35.94 KG/M2 | WEIGHT: 216 LBS | DIASTOLIC BLOOD PRESSURE: 60 MMHG | HEART RATE: 105 BPM

## 2025-03-18 DIAGNOSIS — Z34.92 PRENATAL CARE, SECOND TRIMESTER: Primary | ICD-10-CM

## 2025-03-18 DIAGNOSIS — N89.8 VAGINAL LESION: ICD-10-CM

## 2025-03-18 DIAGNOSIS — Z3A.20 20 WEEKS GESTATION OF PREGNANCY: ICD-10-CM

## 2025-03-18 PROCEDURE — 4004F PT TOBACCO SCREEN RCVD TLK: CPT | Performed by: OBSTETRICS & GYNECOLOGY

## 2025-03-18 PROCEDURE — G8417 CALC BMI ABV UP PARAM F/U: HCPCS | Performed by: OBSTETRICS & GYNECOLOGY

## 2025-03-18 PROCEDURE — G8427 DOCREV CUR MEDS BY ELIG CLIN: HCPCS | Performed by: OBSTETRICS & GYNECOLOGY

## 2025-03-18 PROCEDURE — 99213 OFFICE O/P EST LOW 20 MIN: CPT | Performed by: OBSTETRICS & GYNECOLOGY

## 2025-03-18 RX ORDER — VALACYCLOVIR HYDROCHLORIDE 500 MG/1
500 TABLET, FILM COATED ORAL 2 TIMES DAILY
Qty: 6 TABLET | Refills: 0 | Status: SHIPPED | OUTPATIENT
Start: 2025-03-18

## 2025-03-18 NOTE — PROGRESS NOTES
Patient's last menstrual period was 10/24/2024 (exact date).  Please reference prenatal and OB flow chart for further information  PT here today for routine prenatal care  Pt endorses fetal movement and denies loss of fluid, contractions or vaginal bleeding  Patient complains of a lesion on her vulva that showed up a couple of days ago.  It is burning.  Positive fetal movement recent anatomy ultrasound was done  ROS:  Pt denies headache, vision changes, right upper quadrant pain, dysuria, or nausea/vomiting,     PE:  /60   Pulse (!) 105   Wt 98 kg (216 lb)   LMP 10/24/2024 (Exact Date)   BMI 35.94 kg/m²   Gen - Alert and oriented x 3  HEENT- NC/AT, CVS - RRR, Lungs - CTAB  Abd - FH 20    Ulcerated crop of vulvar lesions on the right labia majora.  HSV culture obtained.      ASSESSMENT AND PLAN:   IUP 20 weeks  Ulcerated vulvar lesion suspect HSV  HSV culture done.  Will treat her empirically Valtrex 500 twice daily  Will call the patient with those results.  Patient to return in 4  Patient is going to have a repeat  section.  But would still treat her with Valtrex at 35 weeks if the culture is positive

## 2025-03-19 LAB
CLUE CELLS VAG QL WET PREP: NORMAL
T VAGINALIS VAG QL WET PREP: NORMAL
TRICHOMONAS VAGINALIS SCREEN: NEGATIVE
YEAST VAG QL WET PREP: NORMAL

## 2025-04-08 ENCOUNTER — TELEPHONE (OUTPATIENT)
Dept: OBGYN CLINIC | Age: 30
End: 2025-04-08

## 2025-04-08 NOTE — TELEPHONE ENCOUNTER
Pt called in for results. Results given, + HSV.    Pt states she did not take the medication and no longer has the lesion.     She would like a phone call to discuss. Pt advised nurse will reach out at end of the day

## 2025-04-08 NOTE — TELEPHONE ENCOUNTER
Spoke to patient and she would like to have the test repeated as she doesn't think the test was accurate

## 2025-04-17 ENCOUNTER — ROUTINE PRENATAL (OUTPATIENT)
Dept: OBGYN CLINIC | Age: 30
End: 2025-04-17

## 2025-04-17 VITALS
BODY MASS INDEX: 35.11 KG/M2 | DIASTOLIC BLOOD PRESSURE: 74 MMHG | WEIGHT: 211 LBS | SYSTOLIC BLOOD PRESSURE: 124 MMHG | HEART RATE: 108 BPM

## 2025-04-17 DIAGNOSIS — R89.5 POSITIVE CULTURE FINDING: ICD-10-CM

## 2025-04-17 DIAGNOSIS — Z34.92 PRENATAL CARE, SECOND TRIMESTER: Primary | ICD-10-CM

## 2025-04-17 DIAGNOSIS — Z34.92 PRENATAL CARE, SECOND TRIMESTER: ICD-10-CM

## 2025-04-17 DIAGNOSIS — Z3A.24 24 WEEKS GESTATION OF PREGNANCY: ICD-10-CM

## 2025-04-17 LAB
HCT VFR BLD AUTO: 32.4 % (ref 37–47)
HGB BLD-MCNC: 11.4 G/DL (ref 12–16)
REAGIN+T PALLIDUM IGG+IGM SERPL-IMP: NORMAL

## 2025-04-17 NOTE — PROGRESS NOTES
Patient's last menstrual period was 10/24/2024 (exact date).  Please reference prenatal and OB flow chart for further information  PT here today for routine prenatal care  Pt endorses fetal movement and denies loss of fluid, contractions or vaginal bleeding  Patient is feeling good movement.  She is not having any other issues.  She is concerned about a false positive result on her HSV culture.  She would like to have antibody testing done.  She denies any prior history.  ROS:  Pt denies headache, vision changes, right upper quadrant pain, dysuria, or nausea/vomiting,     PE:  /74   Pulse (!) 108   Wt 95.7 kg (211 lb)   LMP 10/24/2024 (Exact Date)   BMI 35.11 kg/m²   Gen - Alert and oriented x 3  HEENT- NC/AT, CVS - RRR, Lungs - CTAB  Abd - FH 25        ASSESSMENT AND PLAN:   IUP at 25 weeks  Previous  section x 2 will need to rerepeat.  Discussed a bilateral salpingectomy patient is not interested in doing that at this time.  Will get IgG for HSV 2.  Patient can also do her GTT prior to her next visit.  Spent 30 minutes discussing herpes herpes and herpes in pregnancy also the rationale for repeat  section.  We discussed trial of labor and its risks

## 2025-04-19 LAB — HSV2 GG IGG SER-ACNC: 3.35 IV

## 2025-05-02 ENCOUNTER — RESULTS FOLLOW-UP (OUTPATIENT)
Dept: OBGYN | Age: 30
End: 2025-05-02

## 2025-05-02 NOTE — TELEPHONE ENCOUNTER
----- Message from Dr. Saul Keenan DO sent at 5/2/2025  9:26 AM EDT -----  Antibody for HSV 2 was +.

## 2025-05-05 DIAGNOSIS — Z34.92 PRENATAL CARE, SECOND TRIMESTER: ICD-10-CM

## 2025-05-05 LAB — GLUCOSE, 1HR PP: 94 MG/DL (ref 60–140)

## 2025-05-20 ENCOUNTER — ROUTINE PRENATAL (OUTPATIENT)
Dept: OBGYN CLINIC | Age: 30
End: 2025-05-20
Payer: COMMERCIAL

## 2025-05-20 VITALS
WEIGHT: 212 LBS | SYSTOLIC BLOOD PRESSURE: 110 MMHG | DIASTOLIC BLOOD PRESSURE: 60 MMHG | HEART RATE: 108 BPM | BODY MASS INDEX: 35.28 KG/M2

## 2025-05-20 DIAGNOSIS — Z3A.29 29 WEEKS GESTATION OF PREGNANCY: ICD-10-CM

## 2025-05-20 DIAGNOSIS — O26.892 RH NEGATIVE STATUS DURING PREGNANCY IN SECOND TRIMESTER: ICD-10-CM

## 2025-05-20 DIAGNOSIS — Z34.93 PRENATAL CARE, THIRD TRIMESTER: Primary | ICD-10-CM

## 2025-05-20 DIAGNOSIS — Z67.91 RH NEGATIVE STATUS DURING PREGNANCY IN SECOND TRIMESTER: ICD-10-CM

## 2025-05-20 PROCEDURE — 96372 THER/PROPH/DIAG INJ SC/IM: CPT | Performed by: OBSTETRICS & GYNECOLOGY

## 2025-05-20 PROCEDURE — 4004F PT TOBACCO SCREEN RCVD TLK: CPT | Performed by: OBSTETRICS & GYNECOLOGY

## 2025-05-20 PROCEDURE — G8417 CALC BMI ABV UP PARAM F/U: HCPCS | Performed by: OBSTETRICS & GYNECOLOGY

## 2025-05-20 PROCEDURE — G8427 DOCREV CUR MEDS BY ELIG CLIN: HCPCS | Performed by: OBSTETRICS & GYNECOLOGY

## 2025-05-20 PROCEDURE — 99213 OFFICE O/P EST LOW 20 MIN: CPT | Performed by: OBSTETRICS & GYNECOLOGY

## 2025-05-20 NOTE — PROGRESS NOTES
Patient's last menstrual period was 10/24/2024 (exact date).  Please reference prenatal and OB flow chart for further information  PT here today for routine prenatal care  Pt endorses fetal movement and denies loss of fluid, contractions or vaginal bleeding  No complaints.  Positive fetal movement.  IgG antibodies for HSV-2 were positive  ROS:  Pt denies headache, vision changes, right upper quadrant pain, dysuria, or nausea/vomiting,     PE:  /60   Pulse (!) 108   Wt 96.2 kg (212 lb)   LMP 10/24/2024 (Exact Date)   BMI 35.28 kg/m²   Gen - Alert and oriented x 3  HEENT- NC/AT, CVS - RRR, Lungs - CTAB  Abd - FH 29  FHR 140s      ASSESSMENT AND PLAN:   IUP at 29 weeks and 5 days  Previous  section x 2  Rh-  HSV positive  Plan Valtrex 35 weeks for the remainder of the pregnancy  Patient will get her RhoGAM today  Plan repeat  section at 39 weeks

## 2025-06-06 ENCOUNTER — ROUTINE PRENATAL (OUTPATIENT)
Dept: OBGYN CLINIC | Age: 30
End: 2025-06-06

## 2025-06-06 VITALS
WEIGHT: 213 LBS | HEART RATE: 108 BPM | SYSTOLIC BLOOD PRESSURE: 120 MMHG | BODY MASS INDEX: 35.45 KG/M2 | DIASTOLIC BLOOD PRESSURE: 64 MMHG

## 2025-06-06 DIAGNOSIS — Z23 NEED FOR TDAP VACCINATION: ICD-10-CM

## 2025-06-06 DIAGNOSIS — Z34.93 PRENATAL CARE, THIRD TRIMESTER: Primary | ICD-10-CM

## 2025-06-06 DIAGNOSIS — Z3A.32 32 WEEKS GESTATION OF PREGNANCY: ICD-10-CM

## 2025-06-06 NOTE — PROGRESS NOTES
Patient's last menstrual period was 10/24/2024 (exact date).  Please reference prenatal and OB flow chart for further information  PT here today for routine prenatal care  Pt endorses fetal movement and denies loss of fluid, contractions or vaginal bleeding  No complaints.  Positive fetal movement.  Kick counts twice daily are good  ROS:  Pt denies headache, vision changes, right upper quadrant pain, dysuria, or nausea/vomiting,     PE:  /64   Pulse (!) 108   Wt 96.6 kg (213 lb)   LMP 10/24/2024 (Exact Date)   BMI 35.45 kg/m²   Gen - Alert and oriented x 3  HEENT- NC/AT, CVS - RRR, Lungs - CTAB  Abd - FH 33  FHR  145    ASSESSMENT AND PLAN:   IUP at 32 weeks and 1 day  Previous  section x 2  History of HSV  Patient return in 2 weeks  Will start Valtrex at 35 weeks  Repeat ultrasound at 36 to 37 weeks.  Repeat  section at 39 weeks.

## 2025-06-24 ENCOUNTER — ROUTINE PRENATAL (OUTPATIENT)
Dept: OBGYN CLINIC | Age: 30
End: 2025-06-24
Payer: COMMERCIAL

## 2025-06-24 VITALS
BODY MASS INDEX: 35.94 KG/M2 | SYSTOLIC BLOOD PRESSURE: 134 MMHG | DIASTOLIC BLOOD PRESSURE: 62 MMHG | WEIGHT: 216 LBS | HEART RATE: 104 BPM

## 2025-06-24 DIAGNOSIS — Z34.93 PRENATAL CARE, THIRD TRIMESTER: Primary | ICD-10-CM

## 2025-06-24 PROCEDURE — 99213 OFFICE O/P EST LOW 20 MIN: CPT | Performed by: OBSTETRICS & GYNECOLOGY

## 2025-06-24 PROCEDURE — 4004F PT TOBACCO SCREEN RCVD TLK: CPT | Performed by: OBSTETRICS & GYNECOLOGY

## 2025-06-24 PROCEDURE — G8427 DOCREV CUR MEDS BY ELIG CLIN: HCPCS | Performed by: OBSTETRICS & GYNECOLOGY

## 2025-06-24 PROCEDURE — G8417 CALC BMI ABV UP PARAM F/U: HCPCS | Performed by: OBSTETRICS & GYNECOLOGY

## 2025-06-24 NOTE — PROGRESS NOTES
Patient's last menstrual period was 10/24/2024 (exact date).  Please reference prenatal and OB flow chart for further information  PT here today for routine prenatal care  Pt endorses fetal movement and denies loss of fluid, contractions or vaginal bleeding  Patient no complaints.  Repeat blood pressure was normal denies headaches visual field change or right upper quadrant pain.  She is feeling good movement.  Pt denies headache, vision changes, right upper quadrant pain, dysuria, or nausea/vomiting,     PE:  /62   Pulse (!) 104   Wt 98 kg (216 lb)   LMP 10/24/2024 (Exact Date)   BMI 35.94 kg/m²   Gen - Alert and oriented x 3  HEENT- NC/AT, CVS - RRR, Lungs - CTAB  Abd - FH 36  [id number]      ASSESSMENT AND PLAN:   IUP at 34 weeks and 5 days  Previous  section x 2  Patient to return here in 1 week.  Patient to notify me if she begins to have headaches visual field change or right upper quadrant pain  Will start her on the Valtrex next week

## 2025-07-01 ENCOUNTER — ROUTINE PRENATAL (OUTPATIENT)
Dept: OBGYN CLINIC | Age: 30
End: 2025-07-01
Payer: COMMERCIAL

## 2025-07-01 VITALS
DIASTOLIC BLOOD PRESSURE: 60 MMHG | SYSTOLIC BLOOD PRESSURE: 112 MMHG | BODY MASS INDEX: 36.44 KG/M2 | HEART RATE: 102 BPM | WEIGHT: 219 LBS

## 2025-07-01 DIAGNOSIS — Z3A.35 35 WEEKS GESTATION OF PREGNANCY: ICD-10-CM

## 2025-07-01 DIAGNOSIS — Z34.93 PRENATAL CARE, THIRD TRIMESTER: Primary | ICD-10-CM

## 2025-07-01 DIAGNOSIS — N89.8 VAGINAL LESION: ICD-10-CM

## 2025-07-01 PROCEDURE — 99213 OFFICE O/P EST LOW 20 MIN: CPT | Performed by: OBSTETRICS & GYNECOLOGY

## 2025-07-01 PROCEDURE — G8417 CALC BMI ABV UP PARAM F/U: HCPCS | Performed by: OBSTETRICS & GYNECOLOGY

## 2025-07-01 PROCEDURE — 4004F PT TOBACCO SCREEN RCVD TLK: CPT | Performed by: OBSTETRICS & GYNECOLOGY

## 2025-07-01 PROCEDURE — G8427 DOCREV CUR MEDS BY ELIG CLIN: HCPCS | Performed by: OBSTETRICS & GYNECOLOGY

## 2025-07-01 RX ORDER — VALACYCLOVIR HYDROCHLORIDE 500 MG/1
500 TABLET, FILM COATED ORAL 2 TIMES DAILY
Qty: 60 TABLET | Refills: 3 | Status: SHIPPED | OUTPATIENT
Start: 2025-07-01

## 2025-07-01 NOTE — PROGRESS NOTES
Patient's last menstrual period was 10/24/2024 (exact date).  Please reference prenatal and OB flow chart for further information  PT here today for routine prenatal care  Pt endorses fetal movement and denies loss of fluid, contractions or vaginal bleeding  Patient is doing well positive fetal movement counts twice daily are good.  No contractions  ROS:  Pt denies headache, vision changes, right upper quadrant pain, dysuria, or nausea/vomiting,     PE:  /60   Pulse (!) 102   Wt 99.3 kg (219 lb)   LMP 10/24/2024 (Exact Date)   BMI 36.44 kg/m²   Gen - Alert and oriented x 3  HEENT- NC/AT, CVS - RRR, Lungs - CTAB  Abd - FH 36        ASSESSMENT AND PLAN:   IUP at 35 weeks and 5 days  Patient to return in 1 week  Previous  section x 2 for repeat on   History of genital herpes.  Patient to start Valtrex 500 mg twice daily

## 2025-07-02 ENCOUNTER — HOSPITAL ENCOUNTER (OUTPATIENT)
Dept: ULTRASOUND IMAGING | Age: 30
Discharge: HOME | End: 2025-07-04
Attending: OBSTETRICS & GYNECOLOGY
Payer: COMMERCIAL

## 2025-07-02 ENCOUNTER — HOSPITAL ENCOUNTER (OUTPATIENT)
Age: 30
Setting detail: SURGERY ADMIT
Discharge: HOME | End: 2025-07-02
Attending: OBSTETRICS & GYNECOLOGY | Admitting: OBSTETRICS & GYNECOLOGY
Payer: COMMERCIAL

## 2025-07-02 VITALS
RESPIRATION RATE: 18 BRPM | TEMPERATURE: 98.2 F | HEART RATE: 81 BPM | OXYGEN SATURATION: 98 % | SYSTOLIC BLOOD PRESSURE: 123 MMHG | DIASTOLIC BLOOD PRESSURE: 69 MMHG

## 2025-07-02 DIAGNOSIS — Z34.93 PRENATAL CARE, THIRD TRIMESTER: ICD-10-CM

## 2025-07-02 LAB
ALBUMIN SERPL-MCNC: 3.2 G/DL (ref 3.5–4.6)
ALP SERPL-CCNC: 114 U/L (ref 40–130)
ALT SERPL-CCNC: 16 U/L (ref 0–33)
ANION GAP SERPL CALCULATED.3IONS-SCNC: 11 MEQ/L (ref 9–15)
AST SERPL-CCNC: 19 U/L (ref 0–35)
BASOPHILS # BLD: 0 K/UL (ref 0–0.2)
BASOPHILS NFR BLD: 0.3 %
BILIRUB SERPL-MCNC: <0.2 MG/DL (ref 0.2–0.7)
BUN SERPL-MCNC: 4 MG/DL (ref 6–20)
CALCIUM SERPL-MCNC: 8.2 MG/DL (ref 8.5–9.9)
CHLORIDE SERPL-SCNC: 104 MEQ/L (ref 95–107)
CO2 SERPL-SCNC: 20 MEQ/L (ref 20–31)
CREAT SERPL-MCNC: 0.45 MG/DL (ref 0.5–0.9)
CREAT UR-MCNC: 57.8 MG/DL
EOSINOPHIL # BLD: 0.2 K/UL (ref 0–0.7)
EOSINOPHIL NFR BLD: 2.7 %
ERYTHROCYTE [DISTWIDTH] IN BLOOD BY AUTOMATED COUNT: 14 % (ref 11.5–14.5)
GLOBULIN SER CALC-MCNC: 2.6 G/DL (ref 2.3–3.5)
GLUCOSE SERPL-MCNC: 94 MG/DL (ref 70–99)
HCT VFR BLD AUTO: 31.3 % (ref 37–47)
HGB BLD-MCNC: 10.5 G/DL (ref 12–16)
LYMPHOCYTES # BLD: 2.1 K/UL (ref 1–4.8)
LYMPHOCYTES NFR BLD: 29.2 %
MCH RBC QN AUTO: 32.9 PG (ref 27–31.3)
MCHC RBC AUTO-ENTMCNC: 33.5 % (ref 33–37)
MCV RBC AUTO: 98.1 FL (ref 79.4–94.8)
MONOCYTES # BLD: 0.5 K/UL (ref 0.2–0.8)
MONOCYTES NFR BLD: 7 %
NEUTROPHILS # BLD: 4.2 K/UL (ref 1.4–6.5)
NEUTS SEG NFR BLD: 60.2 %
PLATELET # BLD AUTO: 186 K/UL (ref 130–400)
POTASSIUM SERPL-SCNC: 3.4 MEQ/L (ref 3.4–4.9)
PROT SERPL-MCNC: 5.8 G/DL (ref 6.3–8)
PROT UR-MCNC: 8 MG/DL
PROT/CREAT UR-RTO: 0.1 ML/ML
PROT/CREAT UR-RTO: 0.1 ML/ML (ref 0–0.2)
RBC # BLD AUTO: 3.19 M/UL (ref 4.2–5.4)
SODIUM SERPL-SCNC: 135 MEQ/L (ref 135–144)
URATE SERPL-MCNC: 3.4 MG/DL (ref 2.4–5.7)
WBC # BLD AUTO: 7 K/UL (ref 4.8–10.8)

## 2025-07-02 PROCEDURE — 6360000002 HC RX W HCPCS: Performed by: OBSTETRICS & GYNECOLOGY

## 2025-07-02 PROCEDURE — 6370000000 HC RX 637 (ALT 250 FOR IP): Performed by: OBSTETRICS & GYNECOLOGY

## 2025-07-02 PROCEDURE — 99284 EMERGENCY DEPT VISIT MOD MDM: CPT

## 2025-07-02 PROCEDURE — 76815 OB US LIMITED FETUS(S): CPT

## 2025-07-02 PROCEDURE — 80053 COMPREHEN METABOLIC PANEL: CPT

## 2025-07-02 PROCEDURE — 85025 COMPLETE CBC W/AUTO DIFF WBC: CPT

## 2025-07-02 PROCEDURE — 84156 ASSAY OF PROTEIN URINE: CPT

## 2025-07-02 PROCEDURE — 99223 1ST HOSP IP/OBS HIGH 75: CPT | Performed by: STUDENT IN AN ORGANIZED HEALTH CARE EDUCATION/TRAINING PROGRAM

## 2025-07-02 PROCEDURE — 76819 FETAL BIOPHYS PROFIL W/O NST: CPT

## 2025-07-02 PROCEDURE — 84550 ASSAY OF BLOOD/URIC ACID: CPT

## 2025-07-02 RX ORDER — BETAMETHASONE SODIUM PHOSPHATE AND BETAMETHASONE ACETATE 3; 3 MG/ML; MG/ML
12 INJECTION, SUSPENSION INTRA-ARTICULAR; INTRALESIONAL; INTRAMUSCULAR; SOFT TISSUE ONCE
Status: COMPLETED | OUTPATIENT
Start: 2025-07-02 | End: 2025-07-02

## 2025-07-02 RX ORDER — LABETALOL 100 MG/1
100 TABLET, FILM COATED ORAL ONCE
Status: COMPLETED | OUTPATIENT
Start: 2025-07-02 | End: 2025-07-02

## 2025-07-02 RX ADMIN — BETAMETHASONE SODIUM PHOSPHATE AND BETAMETHASONE ACETATE 12 MG: 3; 3 INJECTION, SUSPENSION INTRA-ARTICULAR; INTRALESIONAL; INTRAMUSCULAR at 18:05

## 2025-07-02 RX ADMIN — LABETALOL HYDROCHLORIDE 100 MG: 100 TABLET, FILM COATED ORAL at 18:03

## 2025-07-02 NOTE — FLOWSHEET NOTE
RN spoke with Dr. Keenan, Dr. Keenan states pt is able to be discharged and come back tomorrow for NST and 2nd dose of betamethasone.

## 2025-07-02 NOTE — PROGRESS NOTES
Neutrophils % 60.2 %    Lymphocytes % 29.2 %    Monocytes % 7.0 %    Eosinophils % 2.7 %    Basophils % 0.3 %    Neutrophils Absolute 4.2 1.4 - 6.5 K/uL    Lymphocytes Absolute 2.1 1.0 - 4.8 K/uL    Monocytes Absolute 0.5 0.2 - 0.8 K/uL    Eosinophils Absolute 0.2 0.0 - 0.7 K/uL    Basophils Absolute 0.0 0.0 - 0.2 K/uL   Comprehensive Metabolic Panel    Collection Time: 07/02/25  5:17 PM   Result Value Ref Range    Sodium 135 135 - 144 mEq/L    Potassium 3.4 3.4 - 4.9 mEq/L    Chloride 104 95 - 107 mEq/L    CO2 20 20 - 31 mEq/L    Anion Gap 11 9 - 15 mEq/L    Glucose 94 70 - 99 mg/dL    BUN 4 (L) 6 - 20 mg/dL    Creatinine 0.45 (L) 0.50 - 0.90 mg/dL    Est, Glom Filt Rate >90.0 >60    Calcium 8.2 (L) 8.5 - 9.9 mg/dL    Total Protein 5.8 (L) 6.3 - 8.0 g/dL    Albumin 3.2 (L) 3.5 - 4.6 g/dL    Total Bilirubin <0.2 0.2 - 0.7 mg/dL    Alkaline Phosphatase 114 40 - 130 U/L    ALT 16 0 - 33 U/L    AST 19 0 - 35 U/L    Globulin 2.6 2.3 - 3.5 g/dL   Uric Acid    Collection Time: 07/02/25  5:17 PM   Result Value Ref Range    Uric Acid 3.4 2.4 - 5.7 mg/dL       TRIAGE COURSE:  pt presents from US, sent by Dr. Keenan for NST. She was incidentally noted to have mild range BP. She denies history of the same. She is asymptomatic. [facility] labs completed and negative for PreE. NST reactive. Pt had rare variable decel, last noted at 1755. Pt was left on for extended monitoring and no further decels noted over next hour. BPP was 8/8 today. Per Dr. Keenan, patient was given labetalol 100mg x1 and BMZ x1. BP improved. Pt counseled on BP check at home and given BP cuff. No BP meds to go home with per Dr. Keenan. She is to return tomorrow for second BMZ dose, NST and BP check. Return precautions and kick counts discussed with patient. PreE symptoms discussed. Pt stable for discharge.   Electronically signed by Karen Ulrich DO on 7/2/25 at 7:06 PM EDT         IMPRESSION:     IUGR    DISPOSITION:  Discharge to Home

## 2025-07-03 ENCOUNTER — HOSPITAL ENCOUNTER (OUTPATIENT)
Age: 30
Setting detail: SURGERY ADMIT
Discharge: HOME | End: 2025-07-03
Attending: OBSTETRICS & GYNECOLOGY | Admitting: OBSTETRICS & GYNECOLOGY
Payer: COMMERCIAL

## 2025-07-03 VITALS
HEART RATE: 101 BPM | TEMPERATURE: 99 F | DIASTOLIC BLOOD PRESSURE: 66 MMHG | WEIGHT: 221.9 LBS | SYSTOLIC BLOOD PRESSURE: 139 MMHG | RESPIRATION RATE: 16 BRPM | OXYGEN SATURATION: 99 % | BODY MASS INDEX: 36.97 KG/M2 | HEIGHT: 65 IN

## 2025-07-03 PROBLEM — O13.9 GESTATIONAL HYPERTENSION, ANTEPARTUM: Status: ACTIVE | Noted: 2025-07-03

## 2025-07-03 PROBLEM — O36.5990 FETAL GROWTH RESTRICTION ANTEPARTUM: Status: ACTIVE | Noted: 2025-07-03

## 2025-07-03 PROBLEM — Z3A.36 36 WEEKS GESTATION OF PREGNANCY: Status: ACTIVE | Noted: 2025-07-03

## 2025-07-03 PROCEDURE — 96372 THER/PROPH/DIAG INJ SC/IM: CPT

## 2025-07-03 PROCEDURE — 6360000002 HC RX W HCPCS: Performed by: OBSTETRICS & GYNECOLOGY

## 2025-07-03 PROCEDURE — 59025 FETAL NON-STRESS TEST: CPT

## 2025-07-03 PROCEDURE — 59025 FETAL NON-STRESS TEST: CPT | Performed by: OBSTETRICS & GYNECOLOGY

## 2025-07-03 RX ORDER — BETAMETHASONE SODIUM PHOSPHATE AND BETAMETHASONE ACETATE 3; 3 MG/ML; MG/ML
12 INJECTION, SUSPENSION INTRA-ARTICULAR; INTRALESIONAL; INTRAMUSCULAR; SOFT TISSUE ONCE
Status: COMPLETED | OUTPATIENT
Start: 2025-07-03 | End: 2025-07-03

## 2025-07-03 RX ADMIN — BETAMETHASONE SODIUM PHOSPHATE AND BETAMETHASONE ACETATE 12 MG: 3; 3 INJECTION, SUSPENSION INTRA-ARTICULAR; INTRALESIONAL; INTRAMUSCULAR at 17:03

## 2025-07-03 NOTE — ED PROVIDER NOTES
SUBJECTIVE:  The patient is a 29 y.o. female 36w0d.    OB History          3    Para   2    Term   2       0    AB   0    Living   2         SAB   0    IAB   0    Ectopic   0    Molar   0    Multiple   0    Live Births   2              Patient presents for NST fetus small for gestational age.     Prenatal record from office was reviewed    OBJECTIVE    Vitals:  /66   Pulse (!) 101   Temp 99 °F (37.2 °C) (Oral)   Resp 16   Ht 1.651 m (5' 5\")   Wt 100.7 kg (221 lb 14.4 oz)   LMP 10/24/2024 (Exact Date)   SpO2 99%   BMI 36.93 kg/m²     Fetal heart rate:         Baseline Heart Rate:  140        Accelerations:  present       Decelerations:  absent       Variability:  moderate    Contraction frequency: none        ASSESSMENT & PLAN:      A:  1) IUP at 36 weeks        2) reactive    P: D/C home follow up for repeat weekly nst discussed with william

## 2025-07-06 ENCOUNTER — HOSPITAL ENCOUNTER (OUTPATIENT)
Age: 30
Discharge: HOME | End: 2025-07-06
Attending: OBSTETRICS & GYNECOLOGY | Admitting: OBSTETRICS & GYNECOLOGY
Payer: COMMERCIAL

## 2025-07-06 VITALS
DIASTOLIC BLOOD PRESSURE: 76 MMHG | HEART RATE: 104 BPM | RESPIRATION RATE: 16 BRPM | TEMPERATURE: 98.3 F | OXYGEN SATURATION: 98 % | SYSTOLIC BLOOD PRESSURE: 144 MMHG

## 2025-07-06 PROCEDURE — 59025 FETAL NON-STRESS TEST: CPT | Performed by: OBSTETRICS & GYNECOLOGY

## 2025-07-06 PROCEDURE — 59025 FETAL NON-STRESS TEST: CPT

## 2025-07-06 NOTE — PROGRESS NOTES
Pt here for scheduled NST for IUGR 2% as of last u/s, pt feels good movement, no leaking, no bleeding, no pain or ctx.

## 2025-07-06 NOTE — PROGRESS NOTES
Dr Eisenberg reviews traciing, reactive nst, aware of BP and that pt denies symptoms, has cuff at home and sees dr thomas tomorrow in office. Pt ok for discharge

## 2025-07-06 NOTE — PROGRESS NOTES
NST for IUGR, EFW 2%  FHR: 140's, moderate variability, +accels, -decels  Ctx: none  Cat 1, reactive  Time for NST: approx 20-40min, please see NST strip for additional information  Pt to f/u for plan of care with Dr. Keenan. Pt completed ANS and pt with BP cuff at home with PIH precautions.     Vijaya Eisenberg MD

## 2025-07-06 NOTE — PROGRESS NOTES
Reviewed s/s of preeclampsia with pt, pt has appt with dr thomas tomorrow. Pt asking about last efw, states they sent her up and told her the baby was small but no one told her weight. Pt states her last baby was only 5 lbs. Provided efw of 1948g, pt encouraged to discuss concerns with dr thomas at appt tomorrow.

## 2025-07-08 ENCOUNTER — ANESTHESIA EVENT (OUTPATIENT)
Dept: LABOR AND DELIVERY | Age: 30
End: 2025-07-08
Payer: COMMERCIAL

## 2025-07-08 ENCOUNTER — ROUTINE PRENATAL (OUTPATIENT)
Dept: OBGYN CLINIC | Age: 30
End: 2025-07-08
Payer: COMMERCIAL

## 2025-07-08 VITALS
WEIGHT: 221 LBS | BODY MASS INDEX: 36.78 KG/M2 | SYSTOLIC BLOOD PRESSURE: 124 MMHG | HEART RATE: 99 BPM | DIASTOLIC BLOOD PRESSURE: 64 MMHG

## 2025-07-08 DIAGNOSIS — Z3A.36 36 WEEKS GESTATION OF PREGNANCY: ICD-10-CM

## 2025-07-08 DIAGNOSIS — Z34.93 PRENATAL CARE, THIRD TRIMESTER: Primary | ICD-10-CM

## 2025-07-08 PROCEDURE — 4004F PT TOBACCO SCREEN RCVD TLK: CPT | Performed by: OBSTETRICS & GYNECOLOGY

## 2025-07-08 PROCEDURE — G8427 DOCREV CUR MEDS BY ELIG CLIN: HCPCS | Performed by: OBSTETRICS & GYNECOLOGY

## 2025-07-08 PROCEDURE — G8417 CALC BMI ABV UP PARAM F/U: HCPCS | Performed by: OBSTETRICS & GYNECOLOGY

## 2025-07-08 PROCEDURE — 59025 FETAL NON-STRESS TEST: CPT | Performed by: OBSTETRICS & GYNECOLOGY

## 2025-07-08 PROCEDURE — 99214 OFFICE O/P EST MOD 30 MIN: CPT | Performed by: OBSTETRICS & GYNECOLOGY

## 2025-07-08 NOTE — PROGRESS NOTES
Patient's last menstrual period was 10/24/2024 (exact date).  Please reference prenatal and OB flow chart for further information  PT here today for routine prenatal care  Pt endorses fetal movement and denies loss of fluid, contractions or vaginal bleeding  Patient is without complaints.  She has had 2 prior  sections.  She has been having biweekly NSTs since the diagnosis of IUGR was made.  Baby is symmetrically small at the 2nd percentile.  She is feeling good movement.  She denies contractions  ROS:  Pt denies headache, vision changes, right upper quadrant pain, dysuria, or nausea/vomiting,     PE:  /64   Pulse 99   Wt 100.2 kg (221 lb)   LMP 10/24/2024 (Exact Date)   BMI 36.78 kg/m²   Gen - Alert and oriented x 3  HEENT- NC/AT, CVS - RRR, Lungs - CTAB  Abd - FH 37    Nonstress test is reactive.  Indication is severe IUGR.  Duration is 30 minutes.  Baseline heart rates in the 140s.  Positive accelerations negative decelerations moderate variability      ASSESSMENT AND PLAN:   IUP at 36 weeks and 5 days  Severe symmetrical IUGR at the 2nd percentile  Reactive NST  Previous  section x 2  Patient is scheduled for repeat  section at 37 weeks which is July 10.  At 07 30.

## 2025-07-10 ENCOUNTER — HOSPITAL ENCOUNTER (INPATIENT)
Age: 30
LOS: 1 days | Discharge: HOME | End: 2025-07-11
Attending: OBSTETRICS & GYNECOLOGY | Admitting: OBSTETRICS & GYNECOLOGY
Payer: COMMERCIAL

## 2025-07-10 ENCOUNTER — ANESTHESIA (OUTPATIENT)
Dept: LABOR AND DELIVERY | Age: 30
End: 2025-07-10
Payer: COMMERCIAL

## 2025-07-10 DIAGNOSIS — G89.18 POSTOPERATIVE PAIN: Primary | ICD-10-CM

## 2025-07-10 LAB
ABO/RH: NORMAL
ALBUMIN SERPL-MCNC: 3.3 G/DL (ref 3.5–4.6)
ALP SERPL-CCNC: 109 U/L (ref 40–130)
ALT SERPL-CCNC: 18 U/L (ref 0–33)
AMPHET UR QL SCN: NORMAL
ANION GAP SERPL CALCULATED.3IONS-SCNC: 12 MEQ/L (ref 9–15)
ANTIBODY SCREEN: NORMAL
AST SERPL-CCNC: 16 U/L (ref 0–35)
BARBITURATES UR QL SCN: NORMAL
BENZODIAZ UR QL SCN: NORMAL
BILIRUB SERPL-MCNC: <0.2 MG/DL (ref 0.2–0.7)
BILIRUB UR QL STRIP: NEGATIVE
BUN SERPL-MCNC: 4 MG/DL (ref 6–20)
CALCIUM SERPL-MCNC: 8.2 MG/DL (ref 8.5–9.9)
CANNABINOIDS UR QL SCN: NORMAL
CHLORIDE SERPL-SCNC: 107 MEQ/L (ref 95–107)
CLARITY UR: CLEAR
CO2 SERPL-SCNC: 20 MEQ/L (ref 20–31)
COCAINE UR QL SCN: NORMAL
COLOR UR: YELLOW
CREAT SERPL-MCNC: 0.44 MG/DL (ref 0.5–0.9)
DRUG SCREEN COMMENT UR-IMP: NORMAL
ERYTHROCYTE [DISTWIDTH] IN BLOOD BY AUTOMATED COUNT: 14.5 % (ref 11.5–14.5)
FENTANYL SCREEN, URINE: NORMAL
FETAL SCREEN: NORMAL
GLOBULIN SER CALC-MCNC: 2.9 G/DL (ref 2.3–3.5)
GLUCOSE SERPL-MCNC: 94 MG/DL (ref 70–99)
GLUCOSE UR STRIP-MCNC: NEGATIVE MG/DL
HBV SURFACE AG SERPL QL IA: NORMAL
HCT VFR BLD AUTO: 31.6 % (ref 37–47)
HGB BLD-MCNC: 10.6 G/DL (ref 12–16)
HGB UR QL STRIP: NEGATIVE
KETONES UR STRIP-MCNC: NEGATIVE MG/DL
LEUKOCYTE ESTERASE UR QL STRIP: NEGATIVE
MCH RBC QN AUTO: 33.2 PG (ref 27–31.3)
MCHC RBC AUTO-ENTMCNC: 33.5 % (ref 33–37)
MCV RBC AUTO: 99.1 FL (ref 79.4–94.8)
METHADONE UR QL SCN: NORMAL
NITRITE UR QL STRIP: NEGATIVE
OPIATES UR QL SCN: NORMAL
OXYCODONE UR QL SCN: NORMAL
PCP UR QL SCN: NORMAL
PH UR STRIP: 7 [PH] (ref 5–9)
PLATELET # BLD AUTO: 205 K/UL (ref 130–400)
POTASSIUM SERPL-SCNC: 4.1 MEQ/L (ref 3.4–4.9)
PROPOXYPH UR QL SCN: NORMAL
PROT SERPL-MCNC: 6.2 G/DL (ref 6.3–8)
PROT UR STRIP-MCNC: NEGATIVE MG/DL
RBC # BLD AUTO: 3.19 M/UL (ref 4.2–5.4)
REAGIN+T PALLIDUM IGG+IGM SERPL-IMP: NORMAL
SODIUM SERPL-SCNC: 139 MEQ/L (ref 135–144)
SP GR UR STRIP: 1.01 (ref 1–1.03)
URINE REFLEX TO CULTURE: NORMAL
UROBILINOGEN UR STRIP-ACNC: 1 E.U./DL
WBC # BLD AUTO: 5.9 K/UL (ref 4.8–10.8)

## 2025-07-10 PROCEDURE — 2709999900 HC NON-CHARGEABLE SUPPLY: Performed by: OBSTETRICS & GYNECOLOGY

## 2025-07-10 PROCEDURE — 85461 HEMOGLOBIN FETAL: CPT

## 2025-07-10 PROCEDURE — 6370000000 HC RX 637 (ALT 250 FOR IP): Performed by: OBSTETRICS & GYNECOLOGY

## 2025-07-10 PROCEDURE — 3700000000 HC ANESTHESIA ATTENDED CARE: Performed by: OBSTETRICS & GYNECOLOGY

## 2025-07-10 PROCEDURE — 7100000000 HC PACU RECOVERY - FIRST 15 MIN: Performed by: OBSTETRICS & GYNECOLOGY

## 2025-07-10 PROCEDURE — 86780 TREPONEMA PALLIDUM: CPT

## 2025-07-10 PROCEDURE — 2500000003 HC RX 250 WO HCPCS: Performed by: OBSTETRICS & GYNECOLOGY

## 2025-07-10 PROCEDURE — 7100000001 HC PACU RECOVERY - ADDTL 15 MIN: Performed by: OBSTETRICS & GYNECOLOGY

## 2025-07-10 PROCEDURE — 6360000002 HC RX W HCPCS: Performed by: NURSE ANESTHETIST, CERTIFIED REGISTERED

## 2025-07-10 PROCEDURE — 86901 BLOOD TYPING SEROLOGIC RH(D): CPT

## 2025-07-10 PROCEDURE — 2580000003 HC RX 258: Performed by: OBSTETRICS & GYNECOLOGY

## 2025-07-10 PROCEDURE — 87340 HEPATITIS B SURFACE AG IA: CPT

## 2025-07-10 PROCEDURE — 1220000000 HC SEMI PRIVATE OB R&B

## 2025-07-10 PROCEDURE — 86850 RBC ANTIBODY SCREEN: CPT

## 2025-07-10 PROCEDURE — 80053 COMPREHEN METABOLIC PANEL: CPT

## 2025-07-10 PROCEDURE — 6360000002 HC RX W HCPCS: Performed by: OBSTETRICS & GYNECOLOGY

## 2025-07-10 PROCEDURE — 81003 URINALYSIS AUTO W/O SCOPE: CPT

## 2025-07-10 PROCEDURE — 85027 COMPLETE CBC AUTOMATED: CPT

## 2025-07-10 PROCEDURE — 3700000001 HC ADD 15 MINUTES (ANESTHESIA): Performed by: OBSTETRICS & GYNECOLOGY

## 2025-07-10 PROCEDURE — 80307 DRUG TEST PRSMV CHEM ANLYZR: CPT

## 2025-07-10 PROCEDURE — 86900 BLOOD TYPING SEROLOGIC ABO: CPT

## 2025-07-10 PROCEDURE — 3609079900 HC CESAREAN SECTION: Performed by: OBSTETRICS & GYNECOLOGY

## 2025-07-10 RX ORDER — MAG HYDROX/ALUMINUM HYD/SIMETH 400-400-40
1 SUSPENSION, ORAL (FINAL DOSE FORM) ORAL PRN
Status: DISCONTINUED | OUTPATIENT
Start: 2025-07-10 | End: 2025-07-11 | Stop reason: HOSPADM

## 2025-07-10 RX ORDER — SODIUM CHLORIDE 0.9 % (FLUSH) 0.9 %
10 SYRINGE (ML) INJECTION PRN
Status: DISCONTINUED | OUTPATIENT
Start: 2025-07-10 | End: 2025-07-10

## 2025-07-10 RX ORDER — SODIUM CHLORIDE 0.9 % (FLUSH) 0.9 %
5-40 SYRINGE (ML) INJECTION PRN
Status: DISCONTINUED | OUTPATIENT
Start: 2025-07-10 | End: 2025-07-11 | Stop reason: HOSPADM

## 2025-07-10 RX ORDER — SODIUM CHLORIDE, SODIUM LACTATE, POTASSIUM CHLORIDE, CALCIUM CHLORIDE 600; 310; 30; 20 MG/100ML; MG/100ML; MG/100ML; MG/100ML
INJECTION, SOLUTION INTRAVENOUS CONTINUOUS
Status: DISCONTINUED | OUTPATIENT
Start: 2025-07-10 | End: 2025-07-10

## 2025-07-10 RX ORDER — SODIUM CHLORIDE 9 MG/ML
INJECTION, SOLUTION INTRAVENOUS PRN
Status: DISCONTINUED | OUTPATIENT
Start: 2025-07-10 | End: 2025-07-10

## 2025-07-10 RX ORDER — LOPERAMIDE HYDROCHLORIDE 2 MG/1
2 CAPSULE ORAL PRN
Status: DISCONTINUED | OUTPATIENT
Start: 2025-07-10 | End: 2025-07-11 | Stop reason: HOSPADM

## 2025-07-10 RX ORDER — ACETAMINOPHEN 325 MG/1
975 TABLET ORAL ONCE
Status: DISCONTINUED | OUTPATIENT
Start: 2025-07-10 | End: 2025-07-10

## 2025-07-10 RX ORDER — KETOROLAC TROMETHAMINE 30 MG/ML
INJECTION, SOLUTION INTRAMUSCULAR; INTRAVENOUS
Status: DISCONTINUED | OUTPATIENT
Start: 2025-07-10 | End: 2025-07-10 | Stop reason: SDUPTHER

## 2025-07-10 RX ORDER — BISACODYL 10 MG
10 SUPPOSITORY, RECTAL RECTAL DAILY PRN
Status: DISCONTINUED | OUTPATIENT
Start: 2025-07-10 | End: 2025-07-11 | Stop reason: HOSPADM

## 2025-07-10 RX ORDER — CALCIUM CARBONATE 500 MG/1
1000 TABLET, CHEWABLE ORAL 3 TIMES DAILY PRN
Status: DISCONTINUED | OUTPATIENT
Start: 2025-07-10 | End: 2025-07-11 | Stop reason: HOSPADM

## 2025-07-10 RX ORDER — FENTANYL CITRATE 50 UG/ML
INJECTION, SOLUTION INTRAMUSCULAR; INTRAVENOUS
Status: COMPLETED | OUTPATIENT
Start: 2025-07-10 | End: 2025-07-10

## 2025-07-10 RX ORDER — ZOLPIDEM TARTRATE 5 MG/1
5 TABLET ORAL NIGHTLY PRN
Status: DISCONTINUED | OUTPATIENT
Start: 2025-07-10 | End: 2025-07-11 | Stop reason: HOSPADM

## 2025-07-10 RX ORDER — KETOROLAC TROMETHAMINE 30 MG/ML
30 INJECTION, SOLUTION INTRAMUSCULAR; INTRAVENOUS EVERY 6 HOURS
Status: ACTIVE | OUTPATIENT
Start: 2025-07-10 | End: 2025-07-10

## 2025-07-10 RX ORDER — IBUPROFEN 800 MG/1
800 TABLET, FILM COATED ORAL EVERY 8 HOURS
Status: DISCONTINUED | OUTPATIENT
Start: 2025-07-10 | End: 2025-07-11 | Stop reason: HOSPADM

## 2025-07-10 RX ORDER — DEXAMETHASONE SODIUM PHOSPHATE 4 MG/ML
INJECTION, SOLUTION INTRA-ARTICULAR; INTRALESIONAL; INTRAMUSCULAR; INTRAVENOUS; SOFT TISSUE
Status: DISCONTINUED | OUTPATIENT
Start: 2025-07-10 | End: 2025-07-10 | Stop reason: SDUPTHER

## 2025-07-10 RX ORDER — MODIFIED LANOLIN
OINTMENT (GRAM) TOPICAL
Status: DISCONTINUED | OUTPATIENT
Start: 2025-07-10 | End: 2025-07-11 | Stop reason: HOSPADM

## 2025-07-10 RX ORDER — MORPHINE SULFATE 1 MG/ML
INJECTION, SOLUTION EPIDURAL; INTRATHECAL; INTRAVENOUS
Status: COMPLETED | OUTPATIENT
Start: 2025-07-10 | End: 2025-07-10

## 2025-07-10 RX ORDER — OXYCODONE HYDROCHLORIDE 5 MG/1
10 TABLET ORAL EVERY 6 HOURS PRN
Refills: 0 | Status: DISCONTINUED | OUTPATIENT
Start: 2025-07-10 | End: 2025-07-11 | Stop reason: HOSPADM

## 2025-07-10 RX ORDER — SODIUM CHLORIDE, SODIUM LACTATE, POTASSIUM CHLORIDE, CALCIUM CHLORIDE 600; 310; 30; 20 MG/100ML; MG/100ML; MG/100ML; MG/100ML
INJECTION, SOLUTION INTRAVENOUS CONTINUOUS
Status: DISCONTINUED | OUTPATIENT
Start: 2025-07-10 | End: 2025-07-11 | Stop reason: HOSPADM

## 2025-07-10 RX ORDER — ACETAMINOPHEN 500 MG
1000 TABLET ORAL EVERY 8 HOURS SCHEDULED
Status: DISCONTINUED | OUTPATIENT
Start: 2025-07-10 | End: 2025-07-11 | Stop reason: HOSPADM

## 2025-07-10 RX ORDER — DOCUSATE SODIUM 100 MG/1
100 CAPSULE, LIQUID FILLED ORAL 2 TIMES DAILY
Status: DISCONTINUED | OUTPATIENT
Start: 2025-07-10 | End: 2025-07-11 | Stop reason: HOSPADM

## 2025-07-10 RX ORDER — SODIUM CHLORIDE 9 MG/ML
INJECTION, SOLUTION INTRAVENOUS PRN
Status: DISCONTINUED | OUTPATIENT
Start: 2025-07-10 | End: 2025-07-11 | Stop reason: HOSPADM

## 2025-07-10 RX ORDER — ONDANSETRON 4 MG/1
4 TABLET, ORALLY DISINTEGRATING ORAL EVERY 8 HOURS PRN
Status: DISCONTINUED | OUTPATIENT
Start: 2025-07-10 | End: 2025-07-11 | Stop reason: HOSPADM

## 2025-07-10 RX ORDER — BUPIVACAINE HYDROCHLORIDE 7.5 MG/ML
INJECTION, SOLUTION INTRASPINAL
Status: COMPLETED | OUTPATIENT
Start: 2025-07-10 | End: 2025-07-10

## 2025-07-10 RX ORDER — SCOPOLAMINE 1 MG/3D
1 PATCH, EXTENDED RELEASE TRANSDERMAL
Status: DISCONTINUED | OUTPATIENT
Start: 2025-07-10 | End: 2025-07-10

## 2025-07-10 RX ORDER — ONDANSETRON 2 MG/ML
4 INJECTION INTRAMUSCULAR; INTRAVENOUS EVERY 6 HOURS PRN
Status: DISCONTINUED | OUTPATIENT
Start: 2025-07-10 | End: 2025-07-11 | Stop reason: HOSPADM

## 2025-07-10 RX ORDER — ONDANSETRON 2 MG/ML
INJECTION INTRAMUSCULAR; INTRAVENOUS
Status: DISCONTINUED | OUTPATIENT
Start: 2025-07-10 | End: 2025-07-10 | Stop reason: SDUPTHER

## 2025-07-10 RX ORDER — PHENYLEPHRINE HYDROCHLORIDE 10 MG/ML
INJECTION INTRAVENOUS
Status: DISCONTINUED | OUTPATIENT
Start: 2025-07-10 | End: 2025-07-10 | Stop reason: SDUPTHER

## 2025-07-10 RX ORDER — OXYCODONE HYDROCHLORIDE 5 MG/1
5 TABLET ORAL EVERY 4 HOURS PRN
Status: DISCONTINUED | OUTPATIENT
Start: 2025-07-10 | End: 2025-07-11 | Stop reason: HOSPADM

## 2025-07-10 RX ORDER — NALBUPHINE HYDROCHLORIDE 10 MG/ML
10 INJECTION INTRAMUSCULAR; INTRAVENOUS; SUBCUTANEOUS ONCE
Status: DISCONTINUED | OUTPATIENT
Start: 2025-07-10 | End: 2025-07-11 | Stop reason: HOSPADM

## 2025-07-10 RX ORDER — SODIUM CHLORIDE 0.9 % (FLUSH) 0.9 %
5-40 SYRINGE (ML) INJECTION EVERY 12 HOURS SCHEDULED
Status: DISCONTINUED | OUTPATIENT
Start: 2025-07-10 | End: 2025-07-10

## 2025-07-10 RX ORDER — SIMETHICONE 80 MG
80 TABLET,CHEWABLE ORAL EVERY 6 HOURS PRN
Status: DISCONTINUED | OUTPATIENT
Start: 2025-07-10 | End: 2025-07-11 | Stop reason: HOSPADM

## 2025-07-10 RX ORDER — SODIUM CHLORIDE 0.9 % (FLUSH) 0.9 %
5-40 SYRINGE (ML) INJECTION EVERY 12 HOURS SCHEDULED
Status: DISCONTINUED | OUTPATIENT
Start: 2025-07-10 | End: 2025-07-11 | Stop reason: HOSPADM

## 2025-07-10 RX ORDER — ONDANSETRON 2 MG/ML
4 INJECTION INTRAMUSCULAR; INTRAVENOUS EVERY 6 HOURS PRN
Status: DISCONTINUED | OUTPATIENT
Start: 2025-07-10 | End: 2025-07-10

## 2025-07-10 RX ORDER — SODIUM CHLORIDE, SODIUM LACTATE, POTASSIUM CHLORIDE, AND CALCIUM CHLORIDE .6; .31; .03; .02 G/100ML; G/100ML; G/100ML; G/100ML
1000 INJECTION, SOLUTION INTRAVENOUS ONCE
Status: COMPLETED | OUTPATIENT
Start: 2025-07-10 | End: 2025-07-11

## 2025-07-10 RX ADMIN — BUPIVACAINE HYDROCHLORIDE IN DEXTROSE 13.5 MG: 7.5 INJECTION, SOLUTION SUBARACHNOID at 07:56

## 2025-07-10 RX ADMIN — ACETAMINOPHEN 1000 MG: 500 TABLET ORAL at 21:46

## 2025-07-10 RX ADMIN — KETOROLAC TROMETHAMINE 30 MG: 30 INJECTION, SOLUTION INTRAMUSCULAR at 08:19

## 2025-07-10 RX ADMIN — DEXAMETHASONE SODIUM PHOSPHATE 4 MG: 4 INJECTION INTRA-ARTICULAR; INTRALESIONAL; INTRAMUSCULAR; INTRAVENOUS; SOFT TISSUE at 08:19

## 2025-07-10 RX ADMIN — FENTANYL CITRATE 10 MCG: 50 INJECTION, SOLUTION INTRAMUSCULAR; INTRAVENOUS at 07:56

## 2025-07-10 RX ADMIN — ONDANSETRON 4 MG: 2 INJECTION, SOLUTION INTRAMUSCULAR; INTRAVENOUS at 09:59

## 2025-07-10 RX ADMIN — Medication 999 ML/HR: at 08:18

## 2025-07-10 RX ADMIN — ACETAMINOPHEN 1000 MG: 500 TABLET ORAL at 14:18

## 2025-07-10 RX ADMIN — ONDANSETRON 4 MG: 2 INJECTION, SOLUTION INTRAMUSCULAR; INTRAVENOUS at 08:19

## 2025-07-10 RX ADMIN — MORPHINE SULFATE 0.2 MG: 1 INJECTION EPIDURAL; INTRATHECAL; INTRAVENOUS at 07:56

## 2025-07-10 RX ADMIN — IBUPROFEN 800 MG: 800 TABLET, FILM COATED ORAL at 14:17

## 2025-07-10 RX ADMIN — PHENYLEPHRINE HYDROCHLORIDE 100 MCG: 10 INJECTION INTRAVENOUS at 08:05

## 2025-07-10 RX ADMIN — IBUPROFEN 800 MG: 800 TABLET, FILM COATED ORAL at 21:46

## 2025-07-10 RX ADMIN — SODIUM CHLORIDE: 0.9 INJECTION, SOLUTION INTRAVENOUS at 07:55

## 2025-07-10 RX ADMIN — SODIUM CHLORIDE, PRESERVATIVE FREE 20 MG: 5 INJECTION INTRAVENOUS at 06:54

## 2025-07-10 RX ADMIN — DOCUSATE SODIUM 100 MG: 100 CAPSULE, LIQUID FILLED ORAL at 21:46

## 2025-07-10 RX ADMIN — SODIUM CHLORIDE, SODIUM LACTATE, POTASSIUM CHLORIDE, CALCIUM CHLORIDE 1000 ML: 600; 310; 30; 20 INJECTION, SOLUTION INTRAVENOUS at 06:43

## 2025-07-10 RX ADMIN — CEFAZOLIN 2000 MG: 2 INJECTION, POWDER, FOR SOLUTION INTRAMUSCULAR; INTRAVENOUS at 07:56

## 2025-07-10 ASSESSMENT — PAIN DESCRIPTION - LOCATION
LOCATION: ABDOMEN
LOCATION: ABDOMEN

## 2025-07-10 ASSESSMENT — PAIN SCALES - GENERAL
PAINLEVEL_OUTOF10: 6
PAINLEVEL_OUTOF10: 6

## 2025-07-10 ASSESSMENT — LIFESTYLE VARIABLES: SMOKING_STATUS: 1

## 2025-07-10 ASSESSMENT — PAIN DESCRIPTION - DESCRIPTORS
DESCRIPTORS: SORE;ACHING
DESCRIPTORS: CRAMPING

## 2025-07-10 NOTE — ANESTHESIA POSTPROCEDURE EVALUATION
Department of Anesthesiology  Postprocedure Note    Patient: Melly Rainey  MRN: [id number]  YOB: 1995  Date of evaluation: 7/10/2025    Procedure Summary       Date: 07/10/25 Room / Location: [address]    Anesthesia Start: 0756 Anesthesia Stop:     Procedure:  SECTION Diagnosis:       Encounter for maternal care for low transverse scar from repeat  delivery      (Encounter for maternal care for low transverse scar from repeat  delivery [O34.211])    Surgeons: Saul Keenan DO Responsible Provider: Radha Diehl APRN - CRNA    Anesthesia Type: spinal ASA Status: 3            Anesthesia Type: No value filed.    Callie Phase I:      Callie Phase II:      Anesthesia Post Evaluation    Patient location during evaluation: bedside  Patient participation: complete - patient cannot participate  Level of consciousness: awake  Pain score: 0  Airway patency: patent  Nausea & Vomiting: no vomiting and no nausea  Cardiovascular status: blood pressure returned to baseline  Respiratory status: acceptable  Hydration status: stable  Pain management: adequate        No notable events documented.

## 2025-07-10 NOTE — OP NOTE
Department of Obstetrics and Gynecology   Section Note      Indications: IUGR    Pre-operative Diagnosis: IUP at 37 weeks.  Previous  section x 2.  Fetal growth restriction at the 2nd percentile    Post-operative Diagnosis: Same with the delivery of a viable female  Procedure: Repeat low segment transverse  section  Surgeon: Saul Keenan DO    Assistants: The use of a first assistant was necessary because there was no qualified resident surgeon available.  Dr. Agustin assisted in the proper positioning, prepping, and draping of the patient, intraoperative retraction and suctioning for visualization, passing sutures and suture management.    Anesthesia: Spinal    HPI: This is a 29-year-old  3 para 2-0-0-2 female at 37 weeks.  Baby is at the 2nd percentile with growth a symmetrical growth restriction.  Patient has been having biweekly nonstress tests and they have all been reactive.  Patient's fluid volume remains stable.  Plan is to proceed with a  section repeat at 37 weeks due to a the IUGR    Procedure Details  The risks, benefits, complications, treatment options, and expected outcomes were discussed with the patient.  The patient concurred with the proposed plan, giving informed consent.  The site of surgery properly noted/marked. The patient was taken to Operating Room  identified as Izamartin Redd and the procedure verified as  Delivery. A Time Out was held and the above information confirmed.    After induction of anesthesia, the patient was draped and prepped in the usual sterile manner. A transverse suprapubic skin incision was made then the abdomen was opened with a Pfannenstiel incision.  Fascial incision was made and extended transversely. The fascia was  from the underlying rectus tissue superiorly and inferiorly. The peritoneum was identified and entered. Peritoneal incision was extended longitudinally. The utero-vesical peritoneal

## 2025-07-10 NOTE — H&P
Department of Obstetrics and Gynecology   History & Physical    Pt Name: Melly Rainey  MRN: [id number] Acct #: [id number]  YOB: 1995  Estimated Date of Delivery: 25      HPI: The patient is a 29 y.o.  female  who presents at 37 weeks for a repeat  section.  Baby is at the 2nd percentile of growth with symmetrical IUGR.  Patient has had 2 prior  section    Allergies:    Allergies as of 2025    (No Known Allergies)       Medications:    Current Facility-Administered Medications:     lactated ringers infusion, , IntraVENous, Continuous, Saul Keenan DO    lactated ringers bolus 1,000 mL, 1,000 mL, IntraVENous, Once, Saul Keenan DO, Last Rate: 983.6 mL/hr at 07/10/25 0643, 1,000 mL at 07/10/25 0643    sodium chloride flush 0.9 % injection 5-40 mL, 5-40 mL, IntraVENous, 2 times per day, Saul Keenan DO    sodium chloride flush 0.9 % injection 10 mL, 10 mL, IntraVENous, PRN, Saul Keenan DO    0.9 % sodium chloride infusion, , IntraVENous, PRN, Saul Keenan DO    acetaminophen (TYLENOL) tablet 975 mg, 975 mg, Oral, Once, Saul Keenan DO    oxytocin (PITOCIN) 30 units in 500 mL infusion, 87.3 bill-units/min, IntraVENous, Continuous PRN **AND** oxytocin (PITOCIN) 10 unit bolus from the bag, 10 Units, IntraVENous, PRN, Saul Keenan DO    ondansetron (ZOFRAN) injection 4 mg, 4 mg, IntraVENous, Q6H PRN, Saul Keenan DO    ceFAZolin (ANCEF) 2,000 mg in sterile water 20 mL IV syringe, 2,000 mg, IntraVENous, Once, Saul Keenan DO    scopolamine (TRANSDERM-SCOP) transdermal patch 1 patch, 1 patch, TransDERmal, Q72H, Saul Keenan DO, 1 patch at 07/10/25 0644    OB History:     Gyn History: Denies h/o abnormal pap smear, h/o STDs.     Past Medical History:   Past Medical History:   Diagnosis Date    Anemia     Asthma 2018       Past Surgical History:   Past Surgical History:   Procedure Laterality Date      SECTION N/A 2017     SECTION performed by Saul Monzon MD at [facility]     SECTION N/A 2020     SECTION performed by Saul Keenan DO at [facility]    TONSILLECTOMY AND ADENOIDECTOMY         Social History:   Social History     Tobacco Use   Smoking Status Every Day    Current packs/day: 0.50    Average packs/day: 0.5 packs/day for 4.0 years (2.0 ttl pk-yrs)    Types: Cigarettes   Smokeless Tobacco Current        Family History: Noncontributory; Denies h/o cancer.     ROS:  Negative except as stated in HPI, denies nausea, vomiting, fever, chills, headache or dysuria.     PE:  Vitals:    07/10/25 0700   BP: 137/73   Pulse: 88   Resp: 17   Temp: 97.6 °F (36.4 °C)   SpO2: 99%       General: well nourished, well developed, in no acute distress  CV: Normal heart sounds  Resp: breathing unlabored  Abdomen: Nontender, no rebound, no guarding  FH:  Cx: Close    NST - Cat 1: FHR 140s, moderate variability, +accels, -decels  Reactive NST.  Indication fetal growth restriction.  Duration 20 minutes.  Labs:       Assessment:   29 y.o.  female with 2 previous  sections.  Fetal growth restriction at the 2nd percentile    Plan: Repeat low segment transverse  section      Saul Keenan DO

## 2025-07-10 NOTE — ANESTHESIA PROCEDURE NOTES
Spinal Block    Patient location during procedure: OB  End time: 7/10/2025 8:00 AM  Reason for block: primary anesthetic  Staffing  Performed: resident/CRNA   Resident/CRNA: Radha Diehl APRN - CRNA  Performed by: Radha Diehl APRN - CRNA  Authorized by: Radha Diehl APRN - CRNA    Spinal Block  Patient position: sitting  Prep: Betadine and site prepped and draped  Patient monitoring: cardiac monitor, continuous pulse ox and frequent blood pressure checks  Approach: midline  Location: L4/L5  Provider prep: mask and sterile gloves  Local infiltration: lidocaine  Needle  Needle type: Pencan   Needle gauge: 24 G  Needle length: 4 in  Kit: arrieta  Expiration date: 1/31/2027  Assessment  Sensory level: T6  Events: cerebrospinal fluid  Swirl obtained: Yes  CSF: clear  Attempts: 1  Hemodynamics: stable  Preanesthetic Checklist  Completed: patient identified, IV checked, site marked, risks and benefits discussed, surgical/procedural consents, equipment checked, pre-op evaluation, timeout performed, anesthesia consent given, oxygen available, monitors applied/VS acknowledged, fire risk safety assessment completed and verbalized and blood product R/B/A discussed and consented

## 2025-07-10 NOTE — ANESTHESIA PRE PROCEDURE
Department of Anesthesiology  Preprocedure Note       Name:  Melly Rainey   Age:  29 y.o.  :  1995                                          MRN:  [id number]         Date:  7/10/2025      Surgeon: Surgeon(s):  Saul Keenan DO    Procedure: Procedure(s):   SECTION    Medications prior to admission:   Prior to Admission medications    Medication Sig Start Date End Date Taking? Authorizing Provider   butalbital-acetaminophen-caffeine (FIORICET, ESGIC) -40 MG per tablet Take 1 tablet by mouth every 4 hours as needed for Headaches Max Daily Amount: 6 tablets 25  Yes Saul Keenan DO   ondansetron (ZOFRAN) 8 MG tablet Take 1 tablet by mouth every 8 hours as needed for Nausea or Vomiting 24  Yes Saul Keenan DO   Prenatal Vit-Fe Fumarate-FA (PRENATAL VITAMIN PLUS LOW IRON) 27-1 MG TABS Take 1 tablet by mouth daily 24  Yes Saul Keenan DO   ferrous sulfate (FE TABS) 325 (65 Fe) MG EC tablet Take 1 tablet by mouth 2 times daily 23  Yes Matias Rangel PA   valACYclovir (VALTREX) 500 MG tablet Take 1 tablet by mouth 2 times daily 25   Saul Keenan DO   nicotine (NICODERM CQ) 14 MG/24HR Place 1 patch onto the skin every 24 hours 4/15/22 4/15/23  Meche Jhaveri PA-C       Current medications:    Current Facility-Administered Medications   Medication Dose Route Frequency Provider Last Rate Last Admin    lactated ringers infusion   IntraVENous Continuous Saul Keenan DO        lactated ringers bolus 1,000 mL  1,000 mL IntraVENous Once Saul Keenan .6 mL/hr at 07/10/25 0643 1,000 mL at 07/10/25 0643    sodium chloride flush 0.9 % injection 5-40 mL  5-40 mL IntraVENous 2 times per day Saul Keenan DO        sodium chloride flush 0.9 % injection 10 mL  10 mL IntraVENous PRN Saul Keenan DO        0.9 % sodium chloride infusion   IntraVENous PRN Saul Keenan DO        acetaminophen (TYLENOL) tablet 975 mg  975 mg Oral

## 2025-07-11 VITALS
TEMPERATURE: 97.9 F | RESPIRATION RATE: 16 BRPM | WEIGHT: 222.6 LBS | HEIGHT: 65 IN | DIASTOLIC BLOOD PRESSURE: 60 MMHG | HEART RATE: 74 BPM | OXYGEN SATURATION: 99 % | SYSTOLIC BLOOD PRESSURE: 128 MMHG | BODY MASS INDEX: 37.09 KG/M2

## 2025-07-11 LAB
ERYTHROCYTE [DISTWIDTH] IN BLOOD BY AUTOMATED COUNT: 14.1 % (ref 11.5–14.5)
HCT VFR BLD AUTO: 28.8 % (ref 37–47)
HGB BLD-MCNC: 9.8 G/DL (ref 12–16)
HGB BLD-MCNC: 9.9 G/DL (ref 12–16)
MCH RBC QN AUTO: 33.4 PG (ref 27–31.3)
MCHC RBC AUTO-ENTMCNC: 34 % (ref 33–37)
MCV RBC AUTO: 98.3 FL (ref 79.4–94.8)
PLATELET # BLD AUTO: 179 K/UL (ref 130–400)
RBC # BLD AUTO: 2.93 M/UL (ref 4.2–5.4)
WBC # BLD AUTO: 11.2 K/UL (ref 4.8–10.8)

## 2025-07-11 PROCEDURE — 6370000000 HC RX 637 (ALT 250 FOR IP): Performed by: OBSTETRICS & GYNECOLOGY

## 2025-07-11 PROCEDURE — 85018 HEMOGLOBIN: CPT

## 2025-07-11 PROCEDURE — 96372 THER/PROPH/DIAG INJ SC/IM: CPT

## 2025-07-11 PROCEDURE — 85027 COMPLETE CBC AUTOMATED: CPT

## 2025-07-11 PROCEDURE — 6360000002 HC RX W HCPCS: Performed by: OBSTETRICS & GYNECOLOGY

## 2025-07-11 RX ORDER — OXYCODONE AND ACETAMINOPHEN 5; 325 MG/1; MG/1
1 TABLET ORAL EVERY 6 HOURS PRN
Qty: 20 TABLET | Refills: 0 | Status: SHIPPED | OUTPATIENT
Start: 2025-07-11 | End: 2025-07-16

## 2025-07-11 RX ORDER — IBUPROFEN 800 MG/1
800 TABLET, FILM COATED ORAL 2 TIMES DAILY PRN
Qty: 60 TABLET | Refills: 0 | Status: SHIPPED | OUTPATIENT
Start: 2025-07-11

## 2025-07-11 RX ADMIN — IBUPROFEN 800 MG: 800 TABLET, FILM COATED ORAL at 05:05

## 2025-07-11 RX ADMIN — HUMAN RHO(D) IMMUNE GLOBULIN 300 MCG: 1500 SOLUTION INTRAMUSCULAR; INTRAVENOUS at 04:11

## 2025-07-11 RX ADMIN — ACETAMINOPHEN 1000 MG: 500 TABLET ORAL at 05:05

## 2025-07-11 RX ADMIN — IBUPROFEN 800 MG: 800 TABLET, FILM COATED ORAL at 08:44

## 2025-07-11 RX ADMIN — OXYCODONE 10 MG: 5 TABLET ORAL at 03:18

## 2025-07-11 RX ADMIN — DOCUSATE SODIUM 100 MG: 100 CAPSULE, LIQUID FILLED ORAL at 08:44

## 2025-07-11 ASSESSMENT — PAIN SCALES - GENERAL: PAINLEVEL_OUTOF10: 7

## 2025-07-11 NOTE — DISCHARGE SUMMARY
Obstetric Discharge Summary    Reasons for Admission on 7/10/2025  5:42 AM   Section (Repeat)  IUGR  Prenatal Procedures  NST    Intrapartum Procedures  Repeat LSTCS    Postpartum Procedures  None    [place] Data  Information for the patient's :  Lindsay Rainey [[id number]]   female   Birth Weight: 2.338 kg (5 lb 2.5 oz)  Discharge With Mother  Complications: No    Discharge Diagnosis  Patient Active Problem List    Diagnosis Date Noted    Fetal growth restriction, 2,000-2,499 grams 07/10/2025    Fetal growth restriction antepartum 2025    36 weeks gestation of pregnancy 2025    Gestational hypertension, antepartum 2025    Acquired plantar porokeratosis 2023    Postoperative pain     Admitted to labor and delivery 2020    Previous  section     39 weeks gestation of pregnancy     Abdominal pain during pregnancy in second trimester     Personal history of other infectious and parasitic diseases 2018    Pelvic and perineal pain 2018    Cigarette nicotine dependence without complication 2018    Other specified noninflammatory disorders of vagina 2018    Asthma 2018    False labor     Trichomonal vaginitis     37 weeks gestation of pregnancy        Discharge Information  Current Discharge Medication List        START taking these medications    Details   oxyCODONE-acetaminophen (PERCOCET) 5-325 MG per tablet Take 1 tablet by mouth every 6 hours as needed for Pain for up to 5 days. Intended supply: 5 days. Take lowest dose possible to manage pain Max Daily Amount: 4 tablets  Qty: 20 tablet, Refills: 0    Comments: Reduce doses taken as pain becomes manageable  Associated Diagnoses: Postoperative pain      ibuprofen (ADVIL;MOTRIN) 800 MG tablet Take 1 tablet by mouth 2 times daily as needed for Pain  Qty: 60 tablet, Refills: 0           CONTINUE these medications which have NOT CHANGED    Details   Prenatal Vit-Fe Fumarate-FA

## 2025-07-11 NOTE — PROGRESS NOTES
Patient is stable. Up and ambulating without difficulty. Lumbar area dry, intact and no redness noted

## 2025-07-11 NOTE — PLAN OF CARE
Problem: Pain  Goal: Verbalizes/displays adequate comfort level or baseline comfort level  7/10/2025 161 by Ashish Alejandro RN  Outcome: Progressing  7/10/2025 1115 by Rosalie Dobson RN  Outcome: Progressing  7/10/2025 0621 by Maris Aleman RN  Outcome: Progressing     Problem: Infection - Adult  Goal: Absence of infection at discharge  7/10/2025 161 by Ashish Alejandro RN  Outcome: Progressing  7/10/2025 111 by Rosalie Dobson RN  Outcome: Progressing  7/10/2025 0621 by Maris Aleman RN  Outcome: Progressing  Goal: Absence of infection during hospitalization  7/10/2025 161 by Ashish Alejandro RN  Outcome: Progressing  7/10/2025 111 by Rosalie Dobson RN  Outcome: Progressing  7/10/2025 0621 by Maris Aleman RN  Outcome: Progressing     Problem: Safety - Adult  Goal: Free from fall injury  7/10/2025 161 by Ashish Alejandro RN  Outcome: Progressing  7/10/2025 111 by Rosalie Dobson RN  Outcome: Progressing  7/10/2025 06 by Maris Aleman RN  Outcome: Progressing     Problem: Postpartum  Goal: Experiences normal postpartum course  Description:  Postpartum OB-Pregnancy care plan goal which identifies if the mother is experiencing a normal postpartum course  Outcome: Progressing  Goal: Appropriate maternal -  bonding  Description:  Postpartum OB-Pregnancy care plan goal which identifies if the mother and  are bonding appropriately  Outcome: Progressing  Goal: Establishment of infant feeding pattern  Description:  Postpartum OB-Pregnancy care plan goal which identifies if the mother is establishing a feeding pattern with their   Outcome: Progressing  Goal: Incisions, wounds, or drain sites healing without S/S of infection  Outcome: Progressing     
  Problem: Vaginal Birth or  Section  Goal: Fetal and maternal status remain reassuring during the birth process  Description:  Birth OB-Pregnancy care plan goal which identifies if the fetal and maternal status remain reassuring during the birth process  7/10/2025 1115 by Rosalie Dobson RN  Outcome: Progressing  7/10/2025 0621 by Maris Aleman RN  Outcome: Progressing     Problem: Pain  Goal: Verbalizes/displays adequate comfort level or baseline comfort level  7/10/2025 1115 by Rosalie Dobson RN  Outcome: Progressing  7/10/2025 0621 by Maris Aleman RN  Outcome: Progressing     Problem: Infection - Adult  Goal: Absence of infection at discharge  7/10/2025 1115 by Rosalie Dobson RN  Outcome: Progressing  7/10/2025 0621 by Maris Aleman RN  Outcome: Progressing  Goal: Absence of infection during hospitalization  7/10/2025 1115 by Rosalie Dobson RN  Outcome: Progressing  7/10/2025 0621 by Maris Aleman RN  Outcome: Progressing     Problem: Safety - Adult  Goal: Free from fall injury  7/10/2025 1115 by Rosalie Dobson RN  Outcome: Progressing  7/10/2025 0621 by Maris Aleman RN  Outcome: Progressing     
  Problem: Vaginal Birth or  Section  Goal: Fetal and maternal status remain reassuring during the birth process  Description:  Birth OB-Pregnancy care plan goal which identifies if the fetal and maternal status remain reassuring during the birth process  Outcome: Progressing     Problem: Pain  Goal: Verbalizes/displays adequate comfort level or baseline comfort level  Outcome: Progressing     Problem: Infection - Adult  Goal: Absence of infection at discharge  Outcome: Progressing  Goal: Absence of infection during hospitalization  Outcome: Progressing     Problem: Safety - Adult  Goal: Free from fall injury  Outcome: Progressing     
RN  Outcome: Progressing  7/10/2025 1615 by Ashish Alejandro RN  Outcome: Progressing  Goal: Establishment of infant feeding pattern  Description:  Postpartum OB-Pregnancy care plan goal which identifies if the mother is establishing a feeding pattern with their   7/10/2025 2040 by Cherry Jones RN  Outcome: Progressing  7/10/2025 1615 by Ashish Alejandro RN  Outcome: Progressing  Goal: Incisions, wounds, or drain sites healing without S/S of infection  7/10/2025 2040 by Cherry Jones RN  Outcome: Progressing  7/10/2025 1615 by Ashish Alejandro RN  Outcome: Progressing

## 2025-07-11 NOTE — FLOWSHEET NOTE
Discharge instructions/education given. Post Birth warning signs reviewed with patient. Patients questions/concerns addressed. Patient verbalized understanding.

## 2025-07-11 NOTE — PROGRESS NOTES
CLINICAL PHARMACY NOTE: MEDS TO BEDS    Total # of Prescriptions Filled: 2   The following medications were delivered to the patient:  Oxycodone/APAP 5-325mg Tab   Ibuprofen 800mg Tab     Additional Documentation:

## 2025-07-11 NOTE — CARE COORDINATION
Reason for consult: Positive THC on 2024  Baby Girl: Geo Bernardo   : 7/10/2025  FOB: Al Bernardo   Address: [address] [place] [address]   Contact: [phone]   Baby #3     LSW completed assessment with pt at bedside. Pt lives at Home with FOB and 2 kids. Have everything at home for baby. Clothing, diapers, formula, crib and car seat. Pt denies any transportation or financial issues at this time.   Pt connected with WIC. List of community Resources provided to pt.   Discussed Positive THC with pt at the beginning of pregnancy. Pt report she did not know she was pregnant at the time. Once she found out she stopped using THC.     Pt was very appropriate with baby during the time of assessment.     Pt is able to go home with baby at the time of DC.   Notified OB staff.     Electronically signed by KATALINA Chao on 2025 at 11:26 AM

## 2025-07-12 LAB — GP B STREP SPEC QL CULT: NORMAL

## 2025-07-15 ENCOUNTER — TELEPHONE (OUTPATIENT)
Dept: OBGYN CLINIC | Age: 30
End: 2025-07-15

## 2025-07-15 NOTE — TELEPHONE ENCOUNTER
Pt called in and is asking for a letter that she can attend school and that no heavy lifting is aloud at this time.

## 2025-08-01 ENCOUNTER — OFFICE VISIT (OUTPATIENT)
Dept: OBGYN CLINIC | Age: 30
End: 2025-08-01

## 2025-08-01 VITALS
BODY MASS INDEX: 35.37 KG/M2 | WEIGHT: 212.3 LBS | HEIGHT: 65 IN | HEART RATE: 86 BPM | DIASTOLIC BLOOD PRESSURE: 84 MMHG | SYSTOLIC BLOOD PRESSURE: 120 MMHG

## 2025-08-01 RX ORDER — DROSPIRENONE AND ETHINYL ESTRADIOL 0.02-3(28)
1 KIT ORAL DAILY
Qty: 30 TABLET | Refills: 5 | Status: SHIPPED | OUTPATIENT
Start: 2025-08-01

## 2025-08-01 ASSESSMENT — ENCOUNTER SYMPTOMS
VOMITING: 0
COLOR CHANGE: 0
BACK PAIN: 0
TROUBLE SWALLOWING: 0
ABDOMINAL DISTENTION: 0
SORE THROAT: 0
NAUSEA: 0
VOICE CHANGE: 0
SHORTNESS OF BREATH: 0
ABDOMINAL PAIN: 0
WHEEZING: 0
CONSTIPATION: 0
BLOOD IN STOOL: 0
COUGH: 0
CHEST TIGHTNESS: 0

## 2025-08-01 NOTE — PROGRESS NOTES
HPI:  Melly Rainey (: 1995) is a 29 y.o. female, Established patient, here for evaluation of the following chief complaint(s):  Post-Op Check (1 week post op c -section/No concerns /)  Patient is here 3 weeks following a repeat  section.  Baby had IUGR at the 2nd percentile.  She is doing well baby is doing well also.  Patient is not having any pain.  Her bleeding is scant bowel and bladder function are normal.      SUBJECTIVE/OBJECTIVE:    Past Surgical History:   Procedure Laterality Date     SECTION N/A 2017     SECTION performed by Saul Monzon MD at [facility]     SECTION N/A 2020     SECTION performed by Saul Keenan DO at [facility]     SECTION N/A 7/10/2025     SECTION performed by Saul Keenan DO at [facility]    TONSILLECTOMY AND ADENOIDECTOMY          Review of Systems   Constitutional:  Negative for activity change, appetite change, fatigue and unexpected weight change.   HENT:  Negative for dental problem, ear pain, hearing loss, nosebleeds, sore throat, trouble swallowing and voice change.    Eyes:  Negative for visual disturbance.   Respiratory:  Negative for cough, chest tightness, shortness of breath and wheezing.    Cardiovascular:  Negative for chest pain and palpitations.   Gastrointestinal:  Negative for abdominal distention, abdominal pain, blood in stool, constipation, nausea and vomiting.   Endocrine: Negative for cold intolerance, heat intolerance, polydipsia, polyphagia and polyuria.   Genitourinary:  Negative for difficulty urinating, dyspareunia, dysuria, flank pain, frequency, genital sores, hematuria, menstrual problem, pelvic pain, urgency, vaginal bleeding, vaginal discharge and vaginal pain.   Musculoskeletal:  Negative for arthralgias, back pain, joint swelling and myalgias.   Skin:  Negative for color change and rash.   Allergic/Immunologic: Negative for environmental allergies, food

## (undated) DEVICE — DRESSING TELFA STRL 3X6

## (undated) DEVICE — SUTURE VCRL SZ 3-0 L36IN ABSRB VLT CT L40MM 1/2 CIR J356H

## (undated) DEVICE — SUTURE VICRYL SZ 4-0 L27IN ABSRB UD L60MM KS STR REV CUT NDL J662H

## (undated) DEVICE — STERILE NEOPRENE POWDER-FREE SURGICAL GLOVES WITH NITRILE COATING: Brand: PROTEXIS

## (undated) DEVICE — ASTOUND STANDARD SURGICAL GOWN, XL: Brand: CONVERTORS

## (undated) DEVICE — SUTURE VCRL SZ 1 L36IN ABSRB UD L36MM CT-1 1/2 CIR J947H

## (undated) DEVICE — STAPLER SKIN SQ 30 ABSRB STPL DISP INSORB

## (undated) DEVICE — SUTURE VICRYL SZ 0 L36IN ABSRB VLT L36MM CT-1 1/2 CIR J346H

## (undated) DEVICE — STAPLER SKIN SQ 30 ABSRB STPL DISP INSORB ORDER VIA PHONE OR EMAIL

## (undated) DEVICE — SUTURE VCRL SZ 4-0 L27IN ABSRB UD L60MM KS STR REV CUT NDL J662H

## (undated) DEVICE — SYRINGE BULB EAR ULCER LF ST 1 OZ MEDICHOICE

## (undated) DEVICE — SUTURE VICRYL SZ 1 L36IN ABSRB UD L36MM CT-1 1/2 CIR J947H

## (undated) DEVICE — SUTURE VCRL SZ 0 L36IN ABSRB VLT L36MM CT-1 1/2 CIR J346H